# Patient Record
Sex: FEMALE | Race: ASIAN | NOT HISPANIC OR LATINO
[De-identification: names, ages, dates, MRNs, and addresses within clinical notes are randomized per-mention and may not be internally consistent; named-entity substitution may affect disease eponyms.]

---

## 2019-08-07 ENCOUNTER — APPOINTMENT (OUTPATIENT)
Dept: OTOLARYNGOLOGY | Facility: CLINIC | Age: 59
End: 2019-08-07
Payer: COMMERCIAL

## 2019-08-07 VITALS
BODY MASS INDEX: 27.31 KG/M2 | HEIGHT: 64 IN | DIASTOLIC BLOOD PRESSURE: 74 MMHG | WEIGHT: 160 LBS | HEART RATE: 53 BPM | SYSTOLIC BLOOD PRESSURE: 129 MMHG

## 2019-08-07 DIAGNOSIS — R59.9 ENLARGED LYMPH NODES, UNSPECIFIED: ICD-10-CM

## 2019-08-07 PROCEDURE — 99204 OFFICE O/P NEW MOD 45 MIN: CPT | Mod: 25

## 2019-08-07 PROCEDURE — 31575 DIAGNOSTIC LARYNGOSCOPY: CPT

## 2019-08-07 RX ORDER — LOSARTAN POTASSIUM 100 MG/1
TABLET, FILM COATED ORAL
Refills: 0 | Status: ACTIVE | COMMUNITY

## 2019-08-07 NOTE — CONSULT LETTER
[Dear  ___] : Dear  [unfilled], [Consult Letter:] : I had the pleasure of evaluating your patient, [unfilled]. [Please see my note below.] : Please see my note below. [Consult Closing:] : Thank you very much for allowing me to participate in the care of this patient.  If you have any questions, please do not hesitate to contact me. [Sincerely,] : Sincerely, [FreeTextEntry2] : Dr Judy Rivera [FreeTextEntry3] : \par Neil Kumar MD, FACS\par \par Otolaryngology-Head and Neck Surgery\par [employer] [facility]\par

## 2019-08-07 NOTE — HISTORY OF PRESENT ILLNESS
[de-identified] : Patient referred by Dr. Storm ( derm)  for cervical lymphadenopathy. First noticed   3 months ago and getting bigger, no URI prior lymph enlarge. Denies pain, dysphagia, odynophagia, dyspnea, dysphonia, nasal obstruction/bleeding, fever, weakness or weight loss.\par No contact with TB patients or cats.\par US of neck 6/2019 enlarge left level 1 lymph node, no bx done \par \par pcp - Judy

## 2019-08-13 ENCOUNTER — FORM ENCOUNTER (OUTPATIENT)
Age: 59
End: 2019-08-13

## 2019-08-14 ENCOUNTER — APPOINTMENT (OUTPATIENT)
Dept: ULTRASOUND IMAGING | Facility: IMAGING CENTER | Age: 59
End: 2019-08-14
Payer: COMMERCIAL

## 2019-08-14 ENCOUNTER — RESULT REVIEW (OUTPATIENT)
Age: 59
End: 2019-08-14

## 2019-08-14 ENCOUNTER — OUTPATIENT (OUTPATIENT)
Dept: OUTPATIENT SERVICES | Facility: HOSPITAL | Age: 59
LOS: 1 days | End: 2019-08-14
Payer: COMMERCIAL

## 2019-08-14 DIAGNOSIS — R59.0 LOCALIZED ENLARGED LYMPH NODES: ICD-10-CM

## 2019-08-14 PROCEDURE — 88305 TISSUE EXAM BY PATHOLOGIST: CPT

## 2019-08-14 PROCEDURE — 76942 ECHO GUIDE FOR BIOPSY: CPT | Mod: 26

## 2019-08-14 PROCEDURE — 88173 CYTOPATH EVAL FNA REPORT: CPT

## 2019-08-14 PROCEDURE — 88312 SPECIAL STAINS GROUP 1: CPT

## 2019-08-14 PROCEDURE — 20206 BIOPSY MUSCLE PERQ NEEDLE: CPT

## 2019-08-14 PROCEDURE — 88173 CYTOPATH EVAL FNA REPORT: CPT | Mod: 26

## 2019-08-14 PROCEDURE — 76942 ECHO GUIDE FOR BIOPSY: CPT

## 2019-08-14 PROCEDURE — 88305 TISSUE EXAM BY PATHOLOGIST: CPT | Mod: 26

## 2019-08-14 PROCEDURE — 88312 SPECIAL STAINS GROUP 1: CPT | Mod: 26,59

## 2019-08-22 ENCOUNTER — APPOINTMENT (OUTPATIENT)
Dept: OTOLARYNGOLOGY | Facility: CLINIC | Age: 59
End: 2019-08-22
Payer: COMMERCIAL

## 2019-08-22 VITALS
SYSTOLIC BLOOD PRESSURE: 117 MMHG | WEIGHT: 160 LBS | BODY MASS INDEX: 27.31 KG/M2 | HEIGHT: 64 IN | HEART RATE: 53 BPM | DIASTOLIC BLOOD PRESSURE: 70 MMHG

## 2019-08-22 PROCEDURE — 99214 OFFICE O/P EST MOD 30 MIN: CPT

## 2019-08-22 NOTE — HISTORY OF PRESENT ILLNESS
[de-identified] : Patient referred by Dr. Storm ( derm)  for cervical lymphadenopathy. First noticed   3 months ago and was getting bigger. No URI or dental infections recently. Denies pain, dysphagia, odynophagia, dyspnea, dysphonia, nasal obstruction/bleeding, fever, weakness or weight loss.\par No contact with TB patients or cats.\par US of neck 6/2019 enlarge left level 1 lymph node, no bx done \par pt is here for fna result,  post fna no pain or fever.\par Feels like it got slightly smaller after the FNA.\par Complete review of systems which was performed during a previous encounter was reviewed with the patient and there are no changes except as stated in the HPI section.\par

## 2019-08-22 NOTE — CONSULT LETTER
[Courtesy Letter:] : I had the pleasure of seeing your patient, [unfilled], in my office today. [Dear  ___] : Dear  [unfilled], [Please see my note below.] : Please see my note below. [Consult Closing:] : Thank you very much for allowing me to participate in the care of this patient.  If you have any questions, please do not hesitate to contact me. [Sincerely,] : Sincerely, [FreeTextEntry2] : Dr Judy Rivera  [FreeTextEntry3] : \par Neil Kumar MD, FACS\par \par Otolaryngology-Head and Neck Surgery\par [employer] at [facility]\par

## 2019-08-23 RX ORDER — CIPROFLOXACIN HYDROCHLORIDE 500 MG/1
500 TABLET, FILM COATED ORAL TWICE DAILY
Qty: 20 | Refills: 0 | Status: ACTIVE | COMMUNITY
Start: 2019-08-23 | End: 1900-01-01

## 2019-09-09 ENCOUNTER — APPOINTMENT (OUTPATIENT)
Dept: INFECTIOUS DISEASE | Facility: CLINIC | Age: 59
End: 2019-09-09
Payer: COMMERCIAL

## 2019-09-09 VITALS
WEIGHT: 166 LBS | OXYGEN SATURATION: 97 % | TEMPERATURE: 97.2 F | SYSTOLIC BLOOD PRESSURE: 169 MMHG | RESPIRATION RATE: 18 BRPM | HEART RATE: 54 BPM | DIASTOLIC BLOOD PRESSURE: 82 MMHG | HEIGHT: 64 IN | BODY MASS INDEX: 28.34 KG/M2

## 2019-09-09 DIAGNOSIS — R59.0 LOCALIZED ENLARGED LYMPH NODES: ICD-10-CM

## 2019-09-09 DIAGNOSIS — A02.9 SALMONELLA INFECTION, UNSPECIFIED: ICD-10-CM

## 2019-09-09 PROCEDURE — 99205 OFFICE O/P NEW HI 60 MIN: CPT

## 2019-10-16 LAB
ALBUMIN SERPL ELPH-MCNC: 4.3 G/DL
ALP BLD-CCNC: 125 U/L
ALT SERPL-CCNC: 37 U/L
ANION GAP SERPL CALC-SCNC: 15 MMOL/L
AST SERPL-CCNC: 26 U/L
BACTERIA BLD CULT: NORMAL
BACTERIA BLD CULT: NORMAL
BACTERIA STL CULT: NORMAL
BILIRUB SERPL-MCNC: 0.5 MG/DL
BUN SERPL-MCNC: 17 MG/DL
CALCIUM SERPL-MCNC: 9.5 MG/DL
CHLORIDE SERPL-SCNC: 103 MMOL/L
CO2 SERPL-SCNC: 22 MMOL/L
CREAT SERPL-MCNC: 0.76 MG/DL
CRP SERPL-MCNC: 0.24 MG/DL
ERYTHROCYTE [SEDIMENTATION RATE] IN BLOOD BY WESTERGREN METHOD: 35 MM/HR
GLUCOSE SERPL-MCNC: 154 MG/DL
HIV1+2 AB SPEC QL IA.RAPID: NONREACTIVE
LDH SERPL-CCNC: 195 U/L
M TB IFN-G BLD-IMP: NEGATIVE
POTASSIUM SERPL-SCNC: 4.3 MMOL/L
PROT SERPL-MCNC: 7 G/DL
QUANTIFERON TB PLUS MITOGEN MINUS NIL: 6.49 IU/ML
QUANTIFERON TB PLUS NIL: 0.02 IU/ML
QUANTIFERON TB PLUS TB1 MINUS NIL: 0.01 IU/ML
QUANTIFERON TB PLUS TB2 MINUS NIL: 0.01 IU/ML
SODIUM SERPL-SCNC: 140 MMOL/L

## 2019-10-16 NOTE — ASSESSMENT
[FreeTextEntry1] : Ms. Padilla is a pleasant 58 year old female with DM2, CAD for consultation for left cerivcal LN enlargement.\par \par She states about 3 months ago noticed a lump on her left neck. US neck 6/2019 with enlarged left level 1 LN. After FNA was performed 8/14/19, was improving. FNA culture with Salmonella sp.\par  She completed 10 days of cipro, but was developing eye itchiness and took the abx. Was scheduled for second needle aspiration but now resolved. \par \par Recommend:\par -Continue to monitor off antibiotics as lymphadnopathy resolved on Cipro.\par -Check stool culture\par -Check blood cultures x 2 sets\par -Check HIV\par -Check ESR/ CRP\par -Check LDH\par -Check Quant TB Gold\par \par RTC as needed.

## 2019-10-16 NOTE — HISTORY OF PRESENT ILLNESS
[FreeTextEntry1] : Ms. Padilla is a pleasant 58 year old female with DM2, CAD for consultation for left cerivcal LN enlargement.\par \par She states about 3 months ago noticed a lump on her left neck. US neck 6/2019 with enlarged left level 1 LN. After FNA was performed 8/14/19, was improving. FNA culture with Salmonella sp.\par  She completed 10 days of cipro, but was developing eye itchiness and took the abx. Was scheduled for second needle aspiration but now resolved. \par \par She was never dx with TB. Recalls negative Quant at her PMD. Her PMD is Judy Spencer. She was born in [facility] and came to the  in 1987. She visited [facility] in June 2019. Her PMHx includes CAD and DM2 last A1C was 8 a couple months ago. Her PSHx includes CABG x 4 at [facility]. Her allergies include sesaonal allergies, CT dye contrast where she develops hives. Her FHx includes mother and father with heart attacks.\par \par She overall feels well. No fevers, no chills.

## 2020-04-02 ENCOUNTER — INPATIENT (INPATIENT)
Facility: HOSPITAL | Age: 60
LOS: 14 days | Discharge: HOME | End: 2020-04-17
Attending: HOSPITALIST | Admitting: HOSPITALIST
Payer: MEDICAID

## 2020-04-02 VITALS
OXYGEN SATURATION: 91 % | DIASTOLIC BLOOD PRESSURE: 65 MMHG | HEART RATE: 62 BPM | TEMPERATURE: 100 F | RESPIRATION RATE: 19 BRPM | SYSTOLIC BLOOD PRESSURE: 150 MMHG

## 2020-04-02 DIAGNOSIS — I25.10 ATHEROSCLEROTIC HEART DISEASE OF NATIVE CORONARY ARTERY WITHOUT ANGINA PECTORIS: ICD-10-CM

## 2020-04-02 DIAGNOSIS — B34.9 VIRAL INFECTION, UNSPECIFIED: ICD-10-CM

## 2020-04-02 DIAGNOSIS — J96.01 ACUTE RESPIRATORY FAILURE WITH HYPOXIA: ICD-10-CM

## 2020-04-02 DIAGNOSIS — Z29.9 ENCOUNTER FOR PROPHYLACTIC MEASURES, UNSPECIFIED: ICD-10-CM

## 2020-04-02 DIAGNOSIS — I10 ESSENTIAL (PRIMARY) HYPERTENSION: ICD-10-CM

## 2020-04-02 DIAGNOSIS — E11.65 TYPE 2 DIABETES MELLITUS WITH HYPERGLYCEMIA: ICD-10-CM

## 2020-04-02 DIAGNOSIS — Z02.9 ENCOUNTER FOR ADMINISTRATIVE EXAMINATIONS, UNSPECIFIED: ICD-10-CM

## 2020-04-02 DIAGNOSIS — Z95.1 PRESENCE OF AORTOCORONARY BYPASS GRAFT: Chronic | ICD-10-CM

## 2020-04-02 LAB
ALBUMIN SERPL ELPH-MCNC: 3.8 G/DL — SIGNIFICANT CHANGE UP (ref 3.3–5)
ALP SERPL-CCNC: 110 U/L — SIGNIFICANT CHANGE UP (ref 40–120)
ALT FLD-CCNC: 24 U/L — SIGNIFICANT CHANGE UP (ref 4–33)
ANION GAP SERPL CALC-SCNC: 15 MMO/L — HIGH (ref 7–14)
AST SERPL-CCNC: 48 U/L — HIGH (ref 4–32)
BASE EXCESS BLDV CALC-SCNC: 1.9 MMOL/L — SIGNIFICANT CHANGE UP
BASOPHILS # BLD AUTO: 0.01 K/UL — SIGNIFICANT CHANGE UP (ref 0–0.2)
BASOPHILS NFR BLD AUTO: 0.2 % — SIGNIFICANT CHANGE UP (ref 0–2)
BILIRUB SERPL-MCNC: 0.3 MG/DL — SIGNIFICANT CHANGE UP (ref 0.2–1.2)
BUN SERPL-MCNC: 13 MG/DL — SIGNIFICANT CHANGE UP (ref 7–23)
CALCIUM SERPL-MCNC: 8.9 MG/DL — SIGNIFICANT CHANGE UP (ref 8.4–10.5)
CHLORIDE SERPL-SCNC: 95 MMOL/L — LOW (ref 98–107)
CO2 SERPL-SCNC: 23 MMOL/L — SIGNIFICANT CHANGE UP (ref 22–31)
CREAT SERPL-MCNC: 0.79 MG/DL — SIGNIFICANT CHANGE UP (ref 0.5–1.3)
CRP SERPL-MCNC: 68.4 MG/L — HIGH
EOSINOPHIL # BLD AUTO: 0 K/UL — SIGNIFICANT CHANGE UP (ref 0–0.5)
EOSINOPHIL NFR BLD AUTO: 0 % — SIGNIFICANT CHANGE UP (ref 0–6)
ERYTHROCYTE [SEDIMENTATION RATE] IN BLOOD: 58 MM/HR — HIGH (ref 4–25)
FERRITIN SERPL-MCNC: 635 NG/ML — HIGH (ref 15–150)
GLUCOSE SERPL-MCNC: 336 MG/DL — HIGH (ref 70–99)
HCO3 BLDV-SCNC: 25 MMOL/L — SIGNIFICANT CHANGE UP (ref 20–27)
HCT VFR BLD CALC: 37.6 % — SIGNIFICANT CHANGE UP (ref 34.5–45)
HGB BLD-MCNC: 11.9 G/DL — SIGNIFICANT CHANGE UP (ref 11.5–15.5)
IMM GRANULOCYTES NFR BLD AUTO: 0.9 % — SIGNIFICANT CHANGE UP (ref 0–1.5)
LDH SERPL L TO P-CCNC: 474 U/L — HIGH (ref 135–225)
LYMPHOCYTES # BLD AUTO: 0.67 K/UL — LOW (ref 1–3.3)
LYMPHOCYTES # BLD AUTO: 15.1 % — SIGNIFICANT CHANGE UP (ref 13–44)
MCHC RBC-ENTMCNC: 18.7 PG — LOW (ref 27–34)
MCHC RBC-ENTMCNC: 31.6 % — LOW (ref 32–36)
MCV RBC AUTO: 59.1 FL — LOW (ref 80–100)
MONOCYTES # BLD AUTO: 0.28 K/UL — SIGNIFICANT CHANGE UP (ref 0–0.9)
MONOCYTES NFR BLD AUTO: 6.3 % — SIGNIFICANT CHANGE UP (ref 2–14)
NEUTROPHILS # BLD AUTO: 3.44 K/UL — SIGNIFICANT CHANGE UP (ref 1.8–7.4)
NEUTROPHILS NFR BLD AUTO: 77.5 % — HIGH (ref 43–77)
NRBC # FLD: 0 K/UL — SIGNIFICANT CHANGE UP (ref 0–0)
PCO2 BLDV: 44 MMHG — SIGNIFICANT CHANGE UP (ref 41–51)
PH BLDV: 7.4 PH — SIGNIFICANT CHANGE UP (ref 7.32–7.43)
PLATELET # BLD AUTO: 176 K/UL — SIGNIFICANT CHANGE UP (ref 150–400)
PMV BLD: 9.9 FL — SIGNIFICANT CHANGE UP (ref 7–13)
PO2 BLDV: < 24 MMHG — LOW (ref 35–40)
POTASSIUM SERPL-MCNC: 5.1 MMOL/L — SIGNIFICANT CHANGE UP (ref 3.5–5.3)
POTASSIUM SERPL-SCNC: 5.1 MMOL/L — SIGNIFICANT CHANGE UP (ref 3.5–5.3)
PROCALCITONIN SERPL-MCNC: 0.1 NG/ML — SIGNIFICANT CHANGE UP (ref 0.02–0.1)
PROT SERPL-MCNC: 7.2 G/DL — SIGNIFICANT CHANGE UP (ref 6–8.3)
RBC # BLD: 6.36 M/UL — HIGH (ref 3.8–5.2)
RBC # FLD: 16.4 % — HIGH (ref 10.3–14.5)
SAO2 % BLDV: 33 % — LOW (ref 60–85)
SARS-COV-2 RNA SPEC QL NAA+PROBE: DETECTED
SODIUM SERPL-SCNC: 133 MMOL/L — LOW (ref 135–145)
WBC # BLD: 4.44 K/UL — SIGNIFICANT CHANGE UP (ref 3.8–10.5)
WBC # FLD AUTO: 4.44 K/UL — SIGNIFICANT CHANGE UP (ref 3.8–10.5)

## 2020-04-02 PROCEDURE — 71045 X-RAY EXAM CHEST 1 VIEW: CPT | Mod: 26

## 2020-04-02 PROCEDURE — 99233 SBSQ HOSP IP/OBS HIGH 50: CPT

## 2020-04-02 RX ORDER — GLUCAGON INJECTION, SOLUTION 0.5 MG/.1ML
1 INJECTION, SOLUTION SUBCUTANEOUS ONCE
Refills: 0 | Status: DISCONTINUED | OUTPATIENT
Start: 2020-04-02 | End: 2020-04-17

## 2020-04-02 RX ORDER — DEXTROSE 50 % IN WATER 50 %
25 SYRINGE (ML) INTRAVENOUS ONCE
Refills: 0 | Status: DISCONTINUED | OUTPATIENT
Start: 2020-04-02 | End: 2020-04-17

## 2020-04-02 RX ORDER — INSULIN DETEMIR 100/ML (3)
40 INSULIN PEN (ML) SUBCUTANEOUS AT BEDTIME
Refills: 0 | Status: DISCONTINUED | OUTPATIENT
Start: 2020-04-02 | End: 2020-04-02

## 2020-04-02 RX ORDER — SODIUM CHLORIDE 9 MG/ML
1000 INJECTION, SOLUTION INTRAVENOUS
Refills: 0 | Status: DISCONTINUED | OUTPATIENT
Start: 2020-04-02 | End: 2020-04-17

## 2020-04-02 RX ORDER — INSULIN GLARGINE 100 [IU]/ML
40 INJECTION, SOLUTION SUBCUTANEOUS AT BEDTIME
Refills: 0 | Status: DISCONTINUED | OUTPATIENT
Start: 2020-04-02 | End: 2020-04-02

## 2020-04-02 RX ORDER — INSULIN GLARGINE 100 [IU]/ML
40 INJECTION, SOLUTION SUBCUTANEOUS AT BEDTIME
Refills: 0 | Status: DISCONTINUED | OUTPATIENT
Start: 2020-04-02 | End: 2020-04-04

## 2020-04-02 RX ORDER — ACETAMINOPHEN 500 MG
650 TABLET ORAL EVERY 6 HOURS
Refills: 0 | Status: DISCONTINUED | OUTPATIENT
Start: 2020-04-02 | End: 2020-04-17

## 2020-04-02 RX ORDER — HYDROXYCHLOROQUINE SULFATE 200 MG
TABLET ORAL
Refills: 0 | Status: COMPLETED | OUTPATIENT
Start: 2020-04-02 | End: 2020-04-07

## 2020-04-02 RX ORDER — SODIUM CHLORIDE 9 MG/ML
500 INJECTION INTRAMUSCULAR; INTRAVENOUS; SUBCUTANEOUS ONCE
Refills: 0 | Status: COMPLETED | OUTPATIENT
Start: 2020-04-02 | End: 2020-04-02

## 2020-04-02 RX ORDER — DEXTROSE 50 % IN WATER 50 %
15 SYRINGE (ML) INTRAVENOUS ONCE
Refills: 0 | Status: DISCONTINUED | OUTPATIENT
Start: 2020-04-02 | End: 2020-04-17

## 2020-04-02 RX ORDER — SIMVASTATIN 20 MG/1
20 TABLET, FILM COATED ORAL AT BEDTIME
Refills: 0 | Status: DISCONTINUED | OUTPATIENT
Start: 2020-04-02 | End: 2020-04-17

## 2020-04-02 RX ORDER — INSULIN LISPRO 100/ML
VIAL (ML) SUBCUTANEOUS
Refills: 0 | Status: DISCONTINUED | OUTPATIENT
Start: 2020-04-02 | End: 2020-04-17

## 2020-04-02 RX ORDER — ACETAMINOPHEN 500 MG
650 TABLET ORAL ONCE
Refills: 0 | Status: COMPLETED | OUTPATIENT
Start: 2020-04-02 | End: 2020-04-02

## 2020-04-02 RX ORDER — ASPIRIN/CALCIUM CARB/MAGNESIUM 324 MG
81 TABLET ORAL DAILY
Refills: 0 | Status: DISCONTINUED | OUTPATIENT
Start: 2020-04-02 | End: 2020-04-17

## 2020-04-02 RX ORDER — HYDROXYCHLOROQUINE SULFATE 200 MG
400 TABLET ORAL EVERY 12 HOURS
Refills: 0 | Status: COMPLETED | OUTPATIENT
Start: 2020-04-02 | End: 2020-04-03

## 2020-04-02 RX ORDER — INSULIN LISPRO 100/ML
5 VIAL (ML) SUBCUTANEOUS ONCE
Refills: 0 | Status: COMPLETED | OUTPATIENT
Start: 2020-04-02 | End: 2020-04-02

## 2020-04-02 RX ORDER — METOPROLOL TARTRATE 50 MG
25 TABLET ORAL DAILY
Refills: 0 | Status: DISCONTINUED | OUTPATIENT
Start: 2020-04-02 | End: 2020-04-17

## 2020-04-02 RX ORDER — HYDROXYCHLOROQUINE SULFATE 200 MG
200 TABLET ORAL EVERY 12 HOURS
Refills: 0 | Status: COMPLETED | OUTPATIENT
Start: 2020-04-03 | End: 2020-04-07

## 2020-04-02 RX ORDER — METOCLOPRAMIDE HCL 10 MG
10 TABLET ORAL ONCE
Refills: 0 | Status: COMPLETED | OUTPATIENT
Start: 2020-04-02 | End: 2020-04-02

## 2020-04-02 RX ORDER — DEXTROSE 50 % IN WATER 50 %
12.5 SYRINGE (ML) INTRAVENOUS ONCE
Refills: 0 | Status: DISCONTINUED | OUTPATIENT
Start: 2020-04-02 | End: 2020-04-17

## 2020-04-02 RX ADMIN — Medication 10 MILLIGRAM(S): at 10:20

## 2020-04-02 RX ADMIN — SODIUM CHLORIDE 500 MILLILITER(S): 9 INJECTION INTRAMUSCULAR; INTRAVENOUS; SUBCUTANEOUS at 11:00

## 2020-04-02 RX ADMIN — Medication 650 MILLIGRAM(S): at 10:20

## 2020-04-02 RX ADMIN — SODIUM CHLORIDE 500 MILLILITER(S): 9 INJECTION INTRAMUSCULAR; INTRAVENOUS; SUBCUTANEOUS at 10:20

## 2020-04-02 RX ADMIN — Medication 650 MILLIGRAM(S): at 21:48

## 2020-04-02 RX ADMIN — Medication 5 UNIT(S): at 13:40

## 2020-04-02 NOTE — H&P ADULT - NSHPPHYSICALEXAM_GEN_ALL_CORE
Vital Signs Last 24 Hrs  T(C): 37.4 (02 Apr 2020 14:20), Max: 37.8 (02 Apr 2020 08:53)  T(F): 99.3 (02 Apr 2020 14:20), Max: 100 (02 Apr 2020 08:53)  HR: 80 (02 Apr 2020 14:20) (62 - 80)  BP: 155/59 (02 Apr 2020 14:20) (150/65 - 156/65)  RR: 20 (02 Apr 2020 14:20) (19 - 22)  SpO2: 92% (02 Apr 2020 14:20) (91% - 95%)  O2 sat 92% on RA at rest, 85% on RA walking    PHYSICAL EXAM:  GENERAL: NAD, mild distress   HEAD:  Atraumatic, Normocephalic  EYES: EOMI, PERRLA, conjunctiva and sclera clear  ENMT: No tonsillar erythema, exudates, or enlargement; Moist mucous membranes, Good dentition  NECK: Supple, No JVD  NERVOUS SYSTEM: AOX3, motor and sensation grossly intact in b/l UE and b/l LE  PSYCHIATRIC: Appropriate affect and mood  CHEST/LUNG: +Crackles left base> right   HEART: Regular rate and rhythm; No murmurs, rubs, or gallops. No LE edema  ABDOMEN: Soft, Nontender, Nondistended; Bowel sounds present  EXTREMITIES:  2+ Peripheral Pulses, No clubbing, cyanosis  SKIN: No rashes or lesions

## 2020-04-02 NOTE — H&P ADULT - PROBLEM SELECTOR PLAN 4
Hold Losartan in setting of COVID infection  - prn hydralazine for SBP>160 - Hold Losartan in setting of COVID infection, as BPs in acceptable range presently. Will restart if necesssary or given PRN hydralazine for SBP>160

## 2020-04-02 NOTE — H&P ADULT - HISTORY OF PRESENT ILLNESS
Per current hospital medicine emergency protocol, in an effort to reduce COVID exposures and also conserve PPE for necessary encounters, H&P below is obtained from chart review without direct patient contact unless acute changes    60 y/o M with PMHx of HTN, DM (on insulin), CAD s/p CABG here for 3 days of chills, dry cough, nausea, and diffuse persistent headache. Worsening SOB and difficultly breathing today. No recorded fevers at home. Denies abdominal pain, diarrhea, urinary symptoms, chest pain. 60 yo M with PMHx of HTN, DM (on insulin), CAD s/p CABG present with three days of cough, shortness of breath and chills. Also endorsing a frontal headache, and nasal congestion. Presents today because the shortness of breath was non-remitting. Also experiencing nausea and vomiting. No fevers, no sick contacts, but her daughter works in urgent care.

## 2020-04-02 NOTE — H&P ADULT - PROBLEM SELECTOR PLAN 3
CAD s/p CABG   - CAD s/p CABG   No ischemic changes on EKG, low suspicion for ACS   - c/w simvastatin 20  - c/w metoprolol succinate ER 25mg PO daily  - c/w ASA 81 po daily CAD s/p CABG   No ischemic changes on EKG, low suspicion for ACS   - C/w simvastatin 20 mg qhs  - C/w metoprolol succinate ER 25 mg PO daily  - C/w ASA 81 mg PO daily

## 2020-04-02 NOTE — ED PROVIDER NOTE - PHYSICAL EXAMINATION
Gen: well developed female, mild respiratory distress  HEENT: NCAT, EOMI, no nasal discharge, mucous membranes dry  CV: RRR, +S1/S2, no M/R/G  Resp: lungs clear tachypneic RR 22, O2 sat 92% on RA at rest, 85% on RA walking  GI: Abdomen soft non-distended, NTTP, no masses  MSK: No open wounds, no bruising, no LE edema  Neuro: A&Ox4, following commands, moving all four extremities spontaneously  Psych: appropriate mood

## 2020-04-02 NOTE — ED PROVIDER NOTE - CLINICAL SUMMARY MEDICAL DECISION MAKING FREE TEXT BOX
58 y/o F with PMHx of DM, cardiac hx, HTN presents with 3 days of chills, HA, nausea, cough. Vitals stable here. O2 stat 92% at rest, walking O2 stat of 84% with tachycardic. Respiratory rate 30. Differential COVID vs viral vs PNA. Low suspicion for PE or ACS. Plan for labs, chest x-ray. Likely admission given O2 stat and respiratory monitor. 58 y/o F with PMHx of DM, cardiac hx, HTN presents with 3 days of chills, HA, nausea, cough. Vitals stable here. O2 stat 92% at rest, walking O2 stat of 84% with tachycardic. Respiratory rate 30. Differential COVID vs viral vs PNA. Low suspicion for PE or ACS. Plan for labs, chest x-ray. Likely will require admission given need for supplemental O2 at rest and pulse-ox monitoring.

## 2020-04-02 NOTE — H&P ADULT - NSHPLABSRESULTS_GEN_ALL_CORE
LABS:                        11.9   4.44  )-----------( 176      ( 02 Apr 2020 09:50 )             37.6     02 Apr 2020 09:50    133    |  95     |  13     ----------------------------<  336    5.1     |  23     |  0.79     Ca    8.9        02 Apr 2020 09:50    TPro  7.2    /  Alb  3.8    /  TBili  0.3    /  DBili  x      /  AST  48     /  ALT  24     /  AlkPhos  110    02 Apr 2020 09:50    RADIOLOGY & ADDITIONAL TESTS:  CXR: Clear lungs     Imaging Personally Reviewed:  [x] YES

## 2020-04-02 NOTE — H&P ADULT - PROBLEM SELECTOR PLAN 2
Type 2 DM on insulin  - f/u A1c  - ISS Type 2 DM on insulin 40 units of levemir QHS, will continue home regimen   - f/u A1c  - ISS Type 2 DM on insulin 40 units of levemir QHS, will continue home regimen   - lantus 40units qhs   - f/u A1c  - ISS Type 2 DM on insulin 40 units of levemir QHS, will continue home regimen   - C/w Lantus 40 units qhs   - Start ISS tid with FS checks tid before meals, as pt a poorly controlled diabetic  - F/u A1c

## 2020-04-02 NOTE — H&P ADULT - PROBLEM SELECTOR PLAN 5
DVT: SQH  Diet: DASH/TLC  Code: DVT: SQH  Diet: DASH/TLC  Code: Full DVT ppx: Lovenox SQ  Diet: DASH/TLC  Code: Full

## 2020-04-02 NOTE — H&P ADULT - PROBLEM SELECTOR PLAN 1
Fever, tachypnea; pulmonary source given b/l opacifications and hypoxia. Concern for Covid-19   - f/u COVID PCR  - Supplemental oxygen. low grade fever, tachypnea; hypoxic respiratory failure 2/2 viral pna   + COVID PCR  - Supplemental oxygen low grade fever, tachypnea; hypoxic respiratory failure 2/2 viral pna   + COVID PCR  - Supplemental oxygen  - plaquenil low grade fever, tachypnea; hypoxic respiratory failure 2/2 viral pna   + COVID PCR  - Hydroxychloroquine 400 mg x1 given overnight  - Tylenol PRN for fevers  - Robitussin PRN for cough  - Supplemental O2 PRN with NC (6 LPM max) or NRB. Will avoid HFNC or NPPV given suspicion for COVID-19.   - Trend procalcitonin, CRP, ESR, ferritin q48-72 hrs for now  - Monitor respiratory status closely

## 2020-04-02 NOTE — ED PROVIDER NOTE - ATTENDING CONTRIBUTION TO CARE
I performed a face to face evaluation of this patient and performed a full history and physical examination on the patient.  I agree with the resident's history, physical examination, and plan of the patient.  Pt with URI symptoms, with crackles at bases, heart wnl, abd soft nontender, no chest pain, concerning for Covid, will get CXR, labs, EKG and admit as pt with desaturations.

## 2020-04-02 NOTE — H&P ADULT - PROBLEM SELECTOR PLAN 6
Transitions of Care Status:  1.  Name of PCP:   2.  PCP Contacted on Admission: [ ] Y  [ ] N   [ ] N/A  3.  PCP contacted at Discharge:    [ ] Y  [ ] N   [ ] N/A  4.  Post-Discharge Appointment Date and Location:  5.  Summary of Handoff given to PCP:

## 2020-04-02 NOTE — H&P ADULT - NSHPREVIEWOFSYSTEMS_GEN_ALL_CORE
Per ED documentation   CONSTITUTIONAL: No fever, weight loss, or fatigue  EYES: No eye pain, visual disturbances, or discharge  ENMT:  No difficulty hearing, tinnitus, vertigo; No sinus or throat pain  RESPIRATORY: No cough, wheezing, chills or hemoptysis; No shortness of breath  CARDIOVASCULAR: No chest pain, palpitations, dizziness, or leg swelling  GASTROINTESTINAL: No abdominal or epigastric pain. No nausea, vomiting, or hematemesis; No diarrhea or constipation. No melena or hematochezia.  GENITOURINARY: No dysuria, frequency, hematuria, or incontinence  NEUROLOGICAL: No headaches, loss of strength, numbness, or tremors  SKIN: No itching, burning, rashes, or lesions   LYMPH NODES: No enlarged glands  ENDOCRINE: No heat or cold intolerance; No polydipsia or polyuria  MUSCULOSKELETAL: No joint pain or swelling;   PSYCHIATRIC: Denies depression, anxiety  HEME/LYMPH: No easy bruising, or bleeding gums  ALLERGY AND IMMUNOLOGIC: No hives or eczema CONSTITUTIONAL: +chills   EYES: No eye pain, visual disturbances, or discharge  ENMT: No sinus or throat pain  RESPIRATORY: +cough, shortness of breath  CARDIOVASCULAR: No chest pain, palpitations, dizziness, or leg swelling  GASTROINTESTINAL: +Nausea   GENITOURINARY: No dysuria  NEUROLOGICAL: +headache  SKIN: No rashes, or lesions   LYMPH NODES: No enlarged glands  ENDOCRINE: No polydipsia or polyuria  MUSCULOSKELETAL: No joint pain or swelling;   PSYCHIATRIC: Denies depression, anxiety  HEME/LYMPH: No easy bruising, or bleeding gums  ALLERGY AND IMMUNOLOGIC: No hives or eczema

## 2020-04-03 LAB
ALBUMIN SERPL ELPH-MCNC: 3.3 G/DL — SIGNIFICANT CHANGE UP (ref 3.3–5)
ALP SERPL-CCNC: 94 U/L — SIGNIFICANT CHANGE UP (ref 40–120)
ALT FLD-CCNC: 19 U/L — SIGNIFICANT CHANGE UP (ref 4–33)
ANION GAP SERPL CALC-SCNC: 12 MMO/L — SIGNIFICANT CHANGE UP (ref 7–14)
APTT BLD: 28.9 SEC — SIGNIFICANT CHANGE UP (ref 27.5–36.3)
AST SERPL-CCNC: 45 U/L — HIGH (ref 4–32)
BASE EXCESS BLDV CALC-SCNC: 0.4 MMOL/L — SIGNIFICANT CHANGE UP
BASOPHILS # BLD AUTO: 0.01 K/UL — SIGNIFICANT CHANGE UP (ref 0–0.2)
BASOPHILS NFR BLD AUTO: 0.3 % — SIGNIFICANT CHANGE UP (ref 0–2)
BILIRUB SERPL-MCNC: 0.3 MG/DL — SIGNIFICANT CHANGE UP (ref 0.2–1.2)
BLOOD GAS VENOUS - CREATININE: 0.78 MG/DL — SIGNIFICANT CHANGE UP (ref 0.5–1.3)
BLOOD GAS VENOUS - FIO2: 36 — SIGNIFICANT CHANGE UP
BUN SERPL-MCNC: 12 MG/DL — SIGNIFICANT CHANGE UP (ref 7–23)
CALCIUM SERPL-MCNC: 8.7 MG/DL — SIGNIFICANT CHANGE UP (ref 8.4–10.5)
CHLORIDE BLDV-SCNC: 101 MMOL/L — SIGNIFICANT CHANGE UP (ref 96–108)
CHLORIDE SERPL-SCNC: 96 MMOL/L — LOW (ref 98–107)
CK MB BLD-MCNC: 2.88 NG/ML — SIGNIFICANT CHANGE UP (ref 1–4.7)
CK SERPL-CCNC: 870 U/L — HIGH (ref 25–170)
CO2 SERPL-SCNC: 22 MMOL/L — SIGNIFICANT CHANGE UP (ref 22–31)
CREAT SERPL-MCNC: 0.68 MG/DL — SIGNIFICANT CHANGE UP (ref 0.5–1.3)
EOSINOPHIL # BLD AUTO: 0 K/UL — SIGNIFICANT CHANGE UP (ref 0–0.5)
EOSINOPHIL NFR BLD AUTO: 0 % — SIGNIFICANT CHANGE UP (ref 0–6)
GAS PNL BLDV: 132 MMOL/L — LOW (ref 136–146)
GLUCOSE BLDC GLUCOMTR-MCNC: 233 MG/DL — HIGH (ref 70–99)
GLUCOSE BLDC GLUCOMTR-MCNC: 301 MG/DL — HIGH (ref 70–99)
GLUCOSE BLDC GLUCOMTR-MCNC: 304 MG/DL — HIGH (ref 70–99)
GLUCOSE BLDC GLUCOMTR-MCNC: 319 MG/DL — HIGH (ref 70–99)
GLUCOSE BLDV-MCNC: 250 MG/DL — HIGH (ref 70–99)
GLUCOSE SERPL-MCNC: 238 MG/DL — HIGH (ref 70–99)
HBA1C BLD-MCNC: 11.8 % — HIGH (ref 4–5.6)
HCO3 BLDV-SCNC: 25 MMOL/L — SIGNIFICANT CHANGE UP (ref 20–27)
HCT VFR BLD CALC: 35.8 % — SIGNIFICANT CHANGE UP (ref 34.5–45)
HCT VFR BLDV CALC: 36.5 % — SIGNIFICANT CHANGE UP (ref 34.5–45)
HCV AB S/CO SERPL IA: 0.15 S/CO — SIGNIFICANT CHANGE UP (ref 0–0.99)
HCV AB SERPL-IMP: SIGNIFICANT CHANGE UP
HGB BLD-MCNC: 11.3 G/DL — LOW (ref 11.5–15.5)
HGB BLDV-MCNC: 11.9 G/DL — SIGNIFICANT CHANGE UP (ref 11.5–15.5)
IMM GRANULOCYTES NFR BLD AUTO: 0.5 % — SIGNIFICANT CHANGE UP (ref 0–1.5)
INR BLD: 0.91 — SIGNIFICANT CHANGE UP (ref 0.88–1.17)
IRON SATN MFR SERPL: 13 UG/DL — LOW (ref 30–160)
IRON SATN MFR SERPL: 224 UG/DL — SIGNIFICANT CHANGE UP (ref 140–530)
LACTATE BLDV-MCNC: 1.8 MMOL/L — SIGNIFICANT CHANGE UP (ref 0.5–2)
LYMPHOCYTES # BLD AUTO: 1.34 K/UL — SIGNIFICANT CHANGE UP (ref 1–3.3)
LYMPHOCYTES # BLD AUTO: 33.9 % — SIGNIFICANT CHANGE UP (ref 13–44)
MAGNESIUM SERPL-MCNC: 1.9 MG/DL — SIGNIFICANT CHANGE UP (ref 1.6–2.6)
MCHC RBC-ENTMCNC: 18.5 PG — LOW (ref 27–34)
MCHC RBC-ENTMCNC: 31.6 % — LOW (ref 32–36)
MCV RBC AUTO: 58.6 FL — LOW (ref 80–100)
MONOCYTES # BLD AUTO: 0.36 K/UL — SIGNIFICANT CHANGE UP (ref 0–0.9)
MONOCYTES NFR BLD AUTO: 9.1 % — SIGNIFICANT CHANGE UP (ref 2–14)
NEUTROPHILS # BLD AUTO: 2.22 K/UL — SIGNIFICANT CHANGE UP (ref 1.8–7.4)
NEUTROPHILS NFR BLD AUTO: 56.2 % — SIGNIFICANT CHANGE UP (ref 43–77)
NRBC # FLD: 0 K/UL — SIGNIFICANT CHANGE UP (ref 0–0)
PCO2 BLDV: 37 MMHG — LOW (ref 41–51)
PH BLDV: 7.43 PH — SIGNIFICANT CHANGE UP (ref 7.32–7.43)
PHOSPHATE SERPL-MCNC: 2.9 MG/DL — SIGNIFICANT CHANGE UP (ref 2.5–4.5)
PLATELET # BLD AUTO: 182 K/UL — SIGNIFICANT CHANGE UP (ref 150–400)
PMV BLD: 9.6 FL — SIGNIFICANT CHANGE UP (ref 7–13)
PO2 BLDV: 55 MMHG — HIGH (ref 35–40)
POTASSIUM BLDV-SCNC: 3.4 MMOL/L — SIGNIFICANT CHANGE UP (ref 3.4–4.5)
POTASSIUM SERPL-MCNC: 3.6 MMOL/L — SIGNIFICANT CHANGE UP (ref 3.5–5.3)
POTASSIUM SERPL-SCNC: 3.6 MMOL/L — SIGNIFICANT CHANGE UP (ref 3.5–5.3)
PROT SERPL-MCNC: 7.2 G/DL — SIGNIFICANT CHANGE UP (ref 6–8.3)
PROTHROM AB SERPL-ACNC: 10.4 SEC — SIGNIFICANT CHANGE UP (ref 9.8–13.1)
RBC # BLD: 6.11 M/UL — HIGH (ref 3.8–5.2)
RBC # FLD: 15.5 % — HIGH (ref 10.3–14.5)
SAO2 % BLDV: 85.9 % — HIGH (ref 60–85)
SODIUM SERPL-SCNC: 130 MMOL/L — LOW (ref 135–145)
TRANSFERRIN SERPL-MCNC: 189 MG/DL — LOW (ref 200–360)
TROPONIN T, HIGH SENSITIVITY: 13 NG/L — SIGNIFICANT CHANGE UP (ref ?–14)
UIBC SERPL-MCNC: 210.7 UG/DL — SIGNIFICANT CHANGE UP (ref 110–370)
WBC # BLD: 3.95 K/UL — SIGNIFICANT CHANGE UP (ref 3.8–10.5)
WBC # FLD AUTO: 3.95 K/UL — SIGNIFICANT CHANGE UP (ref 3.8–10.5)

## 2020-04-03 PROCEDURE — 99233 SBSQ HOSP IP/OBS HIGH 50: CPT

## 2020-04-03 RX ORDER — ENOXAPARIN SODIUM 100 MG/ML
40 INJECTION SUBCUTANEOUS DAILY
Refills: 0 | Status: DISCONTINUED | OUTPATIENT
Start: 2020-04-03 | End: 2020-04-07

## 2020-04-03 RX ORDER — ACETAMINOPHEN 500 MG
650 TABLET ORAL EVERY 6 HOURS
Refills: 0 | Status: DISCONTINUED | OUTPATIENT
Start: 2020-04-03 | End: 2020-04-03

## 2020-04-03 RX ADMIN — Medication 3: at 00:27

## 2020-04-03 RX ADMIN — Medication 2: at 17:36

## 2020-04-03 RX ADMIN — Medication 81 MILLIGRAM(S): at 12:17

## 2020-04-03 RX ADMIN — INSULIN GLARGINE 40 UNIT(S): 100 INJECTION, SOLUTION SUBCUTANEOUS at 23:59

## 2020-04-03 RX ADMIN — SIMVASTATIN 20 MILLIGRAM(S): 20 TABLET, FILM COATED ORAL at 23:12

## 2020-04-03 RX ADMIN — ENOXAPARIN SODIUM 40 MILLIGRAM(S): 100 INJECTION SUBCUTANEOUS at 12:17

## 2020-04-03 RX ADMIN — Medication 200 MILLIGRAM(S): at 17:37

## 2020-04-03 RX ADMIN — Medication 200 MILLIGRAM(S): at 12:16

## 2020-04-03 RX ADMIN — Medication 400 MILLIGRAM(S): at 12:17

## 2020-04-03 RX ADMIN — Medication 200 MILLIGRAM(S): at 23:12

## 2020-04-03 RX ADMIN — Medication 81 MILLIGRAM(S): at 00:10

## 2020-04-03 RX ADMIN — Medication 3: at 09:05

## 2020-04-03 RX ADMIN — Medication 400 MILLIGRAM(S): at 00:10

## 2020-04-03 RX ADMIN — Medication 4: at 12:16

## 2020-04-03 RX ADMIN — SIMVASTATIN 20 MILLIGRAM(S): 20 TABLET, FILM COATED ORAL at 00:11

## 2020-04-03 RX ADMIN — INSULIN GLARGINE 40 UNIT(S): 100 INJECTION, SOLUTION SUBCUTANEOUS at 00:26

## 2020-04-03 RX ADMIN — Medication 25 MILLIGRAM(S): at 06:14

## 2020-04-03 NOTE — PROGRESS NOTE ADULT - SUBJECTIVE AND OBJECTIVE BOX
Patient feels that breathing is a little better.  SaO2>90 currently on room air.      Vital Signs Last 24 Hrs  T(C): 37.5 (03 Apr 2020 06:10), Max: 38.7 (02 Apr 2020 21:21)  T(F): 99.5 (03 Apr 2020 06:10), Max: 101.6 (02 Apr 2020 21:21)  HR: 67 (03 Apr 2020 06:10) (65 - 80)  BP: 119/58 (03 Apr 2020 06:10) (119/58 - 156/65)  BP(mean): --  RR: 20 (03 Apr 2020 06:10) (20 - 22)  SpO2: 95% (03 Apr 2020 06:10) (92% - 98%)  Daily     Daily   I&O's Summary      Neck: no bruits, JVD, adenopathy  Chest: clear  Cor: [id number], RR, normal S1S2 with no mrght  Abd: soft, nontender, BS+ and no HSM  Ext: w/o CCE  Pulses:2+->dp bilaterally    MEDICATIONS  (STANDING):  aspirin enteric coated 81 milliGRAM(s) Oral daily  dextrose 5%. 1000 milliLiter(s) (50 mL/Hr) IV Continuous <Continuous>  dextrose 50% Injectable 12.5 Gram(s) IV Push once  dextrose 50% Injectable 25 Gram(s) IV Push once  dextrose 50% Injectable 25 Gram(s) IV Push once  enoxaparin Injectable 40 milliGRAM(s) SubCutaneous daily  hydroxychloroquine 400 milliGRAM(s) Oral every 12 hours  hydroxychloroquine 200 milliGRAM(s) Oral every 12 hours  hydroxychloroquine   Oral   insulin glargine Injectable (LANTUS) 40 Unit(s) SubCutaneous at bedtime  insulin lispro (HumaLOG) corrective regimen sliding scale   SubCutaneous three times a day before meals  metoprolol succinate ER 25 milliGRAM(s) Oral daily  simvastatin 20 milliGRAM(s) Oral at bedtime    MEDICATIONS  (PRN):  acetaminophen   Tablet .. 650 milliGRAM(s) Oral every 6 hours PRN Temp greater or equal to 38C (100.4F)  dextrose 40% Gel 15 Gram(s) Oral once PRN Blood Glucose LESS THAN 70 milliGRAM(s)/deciliter  glucagon  Injectable 1 milliGRAM(s) IntraMuscular once PRN Glucose LESS THAN 70 milligrams/deciliter  guaiFENesin   Syrup  (Sugar-Free) 200 milliGRAM(s) Oral every 6 hours PRN Cough      04-03    130<L>  |  96<L>  |  12  ----------------------------<  238<H>  3.6   |  22  |  0.68    Ca    8.7      03 Apr 2020 06:30  Phos  2.9     04-03  Mg     1.9     04-03    TPro  7.2  /  Alb  3.3  /  TBili  0.3  /  DBili  x   /  AST  45<H>  /  ALT  19  /  AlkPhos  94  04-03                          11.3   3.95  )-----------( 182      ( 03 Apr 2020 06:30 )             35.8     PT/INR - ( 03 Apr 2020 06:30 )   PT: 10.4 SEC;   INR: 0.91          PTT - ( 03 Apr 2020 06:30 )  PTT:28.9 SEC    HEALTH ISSUES - PROBLEM Dx:  COVI-19: continue to monitor SaO2  Essential hypertension: well controlled  Coronary artery disease involving native heart without angina pectoris, unspecified vessel or lesion type: stable and asymptomatic  Type 2 diabetes mellitus with hyperglycemia, without long-term current use of insulin: sugars slightly elevated. No med changes for now in view of stress  Acute respiratory failure with hypoxia: improving

## 2020-04-04 LAB
ALBUMIN SERPL ELPH-MCNC: 3.4 G/DL — SIGNIFICANT CHANGE UP (ref 3.3–5)
ALP SERPL-CCNC: 100 U/L — SIGNIFICANT CHANGE UP (ref 40–120)
ALT FLD-CCNC: 21 U/L — SIGNIFICANT CHANGE UP (ref 4–33)
ANION GAP SERPL CALC-SCNC: 15 MMO/L — HIGH (ref 7–14)
ANISOCYTOSIS BLD QL: SIGNIFICANT CHANGE UP
AST SERPL-CCNC: 50 U/L — HIGH (ref 4–32)
BASOPHILS # BLD AUTO: 0.01 K/UL — SIGNIFICANT CHANGE UP (ref 0–0.2)
BASOPHILS NFR BLD AUTO: 0.2 % — SIGNIFICANT CHANGE UP (ref 0–2)
BASOPHILS NFR SPEC: 0 % — SIGNIFICANT CHANGE UP (ref 0–2)
BILIRUB SERPL-MCNC: 0.3 MG/DL — SIGNIFICANT CHANGE UP (ref 0.2–1.2)
BLASTS # FLD: 0 % — SIGNIFICANT CHANGE UP (ref 0–0)
BUN SERPL-MCNC: 12 MG/DL — SIGNIFICANT CHANGE UP (ref 7–23)
CALCIUM SERPL-MCNC: 9.1 MG/DL — SIGNIFICANT CHANGE UP (ref 8.4–10.5)
CHLORIDE SERPL-SCNC: 93 MMOL/L — LOW (ref 98–107)
CO2 SERPL-SCNC: 23 MMOL/L — SIGNIFICANT CHANGE UP (ref 22–31)
CREAT SERPL-MCNC: 0.76 MG/DL — SIGNIFICANT CHANGE UP (ref 0.5–1.3)
EOSINOPHIL # BLD AUTO: 0 K/UL — SIGNIFICANT CHANGE UP (ref 0–0.5)
EOSINOPHIL NFR BLD AUTO: 0 % — SIGNIFICANT CHANGE UP (ref 0–6)
EOSINOPHIL NFR FLD: 0 % — SIGNIFICANT CHANGE UP (ref 0–6)
GLUCOSE BLDC GLUCOMTR-MCNC: 202 MG/DL — HIGH (ref 70–99)
GLUCOSE BLDC GLUCOMTR-MCNC: 248 MG/DL — HIGH (ref 70–99)
GLUCOSE BLDC GLUCOMTR-MCNC: 255 MG/DL — HIGH (ref 70–99)
GLUCOSE BLDC GLUCOMTR-MCNC: 315 MG/DL — HIGH (ref 70–99)
GLUCOSE SERPL-MCNC: 238 MG/DL — HIGH (ref 70–99)
HCT VFR BLD CALC: 37 % — SIGNIFICANT CHANGE UP (ref 34.5–45)
HGB BLD-MCNC: 11.7 G/DL — SIGNIFICANT CHANGE UP (ref 11.5–15.5)
HYPOCHROMIA BLD QL: SIGNIFICANT CHANGE UP
IMM GRANULOCYTES NFR BLD AUTO: 0.7 % — SIGNIFICANT CHANGE UP (ref 0–1.5)
LYMPHOCYTES # BLD AUTO: 1.19 K/UL — SIGNIFICANT CHANGE UP (ref 1–3.3)
LYMPHOCYTES # BLD AUTO: 21.5 % — SIGNIFICANT CHANGE UP (ref 13–44)
LYMPHOCYTES NFR SPEC AUTO: 13 % — SIGNIFICANT CHANGE UP (ref 13–44)
MAGNESIUM SERPL-MCNC: 2.1 MG/DL — SIGNIFICANT CHANGE UP (ref 1.6–2.6)
MCHC RBC-ENTMCNC: 18.7 PG — LOW (ref 27–34)
MCHC RBC-ENTMCNC: 31.6 % — LOW (ref 32–36)
MCV RBC AUTO: 59.2 FL — LOW (ref 80–100)
METAMYELOCYTES # FLD: 0 % — SIGNIFICANT CHANGE UP (ref 0–1)
MICROCYTES BLD QL: SIGNIFICANT CHANGE UP
MONOCYTES # BLD AUTO: 0.43 K/UL — SIGNIFICANT CHANGE UP (ref 0–0.9)
MONOCYTES NFR BLD AUTO: 7.8 % — SIGNIFICANT CHANGE UP (ref 2–14)
MONOCYTES NFR BLD: 7 % — SIGNIFICANT CHANGE UP (ref 2–9)
MYELOCYTES NFR BLD: 0 % — SIGNIFICANT CHANGE UP (ref 0–0)
NEUTROPHIL AB SER-ACNC: 73.9 % — SIGNIFICANT CHANGE UP (ref 43–77)
NEUTROPHILS # BLD AUTO: 3.87 K/UL — SIGNIFICANT CHANGE UP (ref 1.8–7.4)
NEUTROPHILS NFR BLD AUTO: 69.8 % — SIGNIFICANT CHANGE UP (ref 43–77)
NEUTS BAND # BLD: 0.9 % — SIGNIFICANT CHANGE UP (ref 0–6)
NRBC # FLD: 0 K/UL — SIGNIFICANT CHANGE UP (ref 0–0)
OTHER - HEMATOLOGY %: 0 — SIGNIFICANT CHANGE UP
OVALOCYTES BLD QL SMEAR: SIGNIFICANT CHANGE UP
PHOSPHATE SERPL-MCNC: 3.2 MG/DL — SIGNIFICANT CHANGE UP (ref 2.5–4.5)
PLATELET # BLD AUTO: 221 K/UL — SIGNIFICANT CHANGE UP (ref 150–400)
PLATELET COUNT - ESTIMATE: NORMAL — SIGNIFICANT CHANGE UP
PMV BLD: 9.5 FL — SIGNIFICANT CHANGE UP (ref 7–13)
POIKILOCYTOSIS BLD QL AUTO: SIGNIFICANT CHANGE UP
POLYCHROMASIA BLD QL SMEAR: SLIGHT — SIGNIFICANT CHANGE UP
POTASSIUM SERPL-MCNC: 4.1 MMOL/L — SIGNIFICANT CHANGE UP (ref 3.5–5.3)
POTASSIUM SERPL-SCNC: 4.1 MMOL/L — SIGNIFICANT CHANGE UP (ref 3.5–5.3)
PROMYELOCYTES # FLD: 0 % — SIGNIFICANT CHANGE UP (ref 0–0)
PROT SERPL-MCNC: 7.5 G/DL — SIGNIFICANT CHANGE UP (ref 6–8.3)
RBC # BLD: 6.25 M/UL — HIGH (ref 3.8–5.2)
RBC # FLD: 16.3 % — HIGH (ref 10.3–14.5)
SODIUM SERPL-SCNC: 131 MMOL/L — LOW (ref 135–145)
VARIANT LYMPHS # BLD: 4.3 % — SIGNIFICANT CHANGE UP
WBC # BLD: 5.54 K/UL — SIGNIFICANT CHANGE UP (ref 3.8–10.5)
WBC # FLD AUTO: 5.54 K/UL — SIGNIFICANT CHANGE UP (ref 3.8–10.5)

## 2020-04-04 PROCEDURE — 99233 SBSQ HOSP IP/OBS HIGH 50: CPT

## 2020-04-04 RX ORDER — INSULIN LISPRO 100/ML
VIAL (ML) SUBCUTANEOUS AT BEDTIME
Refills: 0 | Status: DISCONTINUED | OUTPATIENT
Start: 2020-04-04 | End: 2020-04-17

## 2020-04-04 RX ORDER — INSULIN GLARGINE 100 [IU]/ML
42 INJECTION, SOLUTION SUBCUTANEOUS AT BEDTIME
Refills: 0 | Status: DISCONTINUED | OUTPATIENT
Start: 2020-04-04 | End: 2020-04-05

## 2020-04-04 RX ORDER — ONDANSETRON 8 MG/1
4 TABLET, FILM COATED ORAL ONCE
Refills: 0 | Status: COMPLETED | OUTPATIENT
Start: 2020-04-04 | End: 2020-04-04

## 2020-04-04 RX ADMIN — Medication 200 MILLIGRAM(S): at 23:57

## 2020-04-04 RX ADMIN — SIMVASTATIN 20 MILLIGRAM(S): 20 TABLET, FILM COATED ORAL at 21:35

## 2020-04-04 RX ADMIN — Medication 25 MILLIGRAM(S): at 05:43

## 2020-04-04 RX ADMIN — Medication 200 MILLIGRAM(S): at 12:28

## 2020-04-04 RX ADMIN — Medication 2: at 08:35

## 2020-04-04 RX ADMIN — Medication 3: at 17:14

## 2020-04-04 RX ADMIN — Medication 81 MILLIGRAM(S): at 12:27

## 2020-04-04 RX ADMIN — Medication 100 MILLIGRAM(S): at 21:35

## 2020-04-04 RX ADMIN — ONDANSETRON 4 MILLIGRAM(S): 8 TABLET, FILM COATED ORAL at 00:28

## 2020-04-04 RX ADMIN — Medication 4: at 12:27

## 2020-04-04 RX ADMIN — INSULIN GLARGINE 42 UNIT(S): 100 INJECTION, SOLUTION SUBCUTANEOUS at 21:35

## 2020-04-04 RX ADMIN — ENOXAPARIN SODIUM 40 MILLIGRAM(S): 100 INJECTION SUBCUTANEOUS at 12:27

## 2020-04-04 RX ADMIN — Medication 2: at 00:33

## 2020-04-04 RX ADMIN — Medication 100 MILLIGRAM(S): at 12:29

## 2020-04-04 RX ADMIN — Medication 200 MILLIGRAM(S): at 21:35

## 2020-04-04 NOTE — PROGRESS NOTE ADULT - SUBJECTIVE AND OBJECTIVE BOX
Patient is a 59y old  Female who presents with a chief complaint of hypoxic respiratory failure, COVID-19+ (03 Apr 2020 10:42)                  Vital Signs Last 24 Hrs  T(C): 36.3 (04 Apr 2020 05:10), Max: 37.2 (03 Apr 2020 22:38)  T(F): 97.4 (04 Apr 2020 05:10), Max: 99 (03 Apr 2020 22:38)  HR: 84 (04 Apr 2020 05:10) (53 - 84)  BP: 121/60 (04 Apr 2020 05:10) (121/60 - 136/64)  BP(mean): --  RR: 18 (04 Apr 2020 05:10) (18 - 24)  SpO2: 97% (04 Apr 2020 05:10) (81% - 97%)  Daily     Daily   I&O's Summary      Neck: no bruits, JVD, adenopathy  Chest: clear  Cor: [id number], RR, normal S1S2 with no mrght  Abd: soft, nontender, BS+ and no HSM  Ext: w/o CCE  Pulses:2+->dp bilaterally    MEDICATIONS  (STANDING):  aspirin enteric coated 81 milliGRAM(s) Oral daily  benzonatate 100 milliGRAM(s) Oral three times a day  dextrose 5%. 1000 milliLiter(s) (50 mL/Hr) IV Continuous <Continuous>  dextrose 50% Injectable 12.5 Gram(s) IV Push once  dextrose 50% Injectable 25 Gram(s) IV Push once  dextrose 50% Injectable 25 Gram(s) IV Push once  enoxaparin Injectable 40 milliGRAM(s) SubCutaneous daily  hydroxychloroquine 200 milliGRAM(s) Oral every 12 hours  hydroxychloroquine   Oral   insulin glargine Injectable (LANTUS) 40 Unit(s) SubCutaneous at bedtime  insulin lispro (HumaLOG) corrective regimen sliding scale   SubCutaneous three times a day before meals  insulin lispro (HumaLOG) corrective regimen sliding scale   SubCutaneous at bedtime  metoprolol succinate ER 25 milliGRAM(s) Oral daily  simvastatin 20 milliGRAM(s) Oral at bedtime    MEDICATIONS  (PRN):  acetaminophen   Tablet .. 650 milliGRAM(s) Oral every 6 hours PRN Temp greater or equal to 38C (100.4F)  dextrose 40% Gel 15 Gram(s) Oral once PRN Blood Glucose LESS THAN 70 milliGRAM(s)/deciliter  glucagon  Injectable 1 milliGRAM(s) IntraMuscular once PRN Glucose LESS THAN 70 milligrams/deciliter  guaiFENesin   Syrup  (Sugar-Free) 200 milliGRAM(s) Oral every 6 hours PRN Cough      04-03    130<L>  |  96<L>  |  12  ----------------------------<  238<H>  3.6   |  22  |  0.68    Ca    8.7      03 Apr 2020 06:30  Phos  2.9     04-03  Mg     1.9     04-03    TPro  7.2  /  Alb  3.3  /  TBili  0.3  /  DBili  x   /  AST  45<H>  /  ALT  19  /  AlkPhos  94  04-03                          11.7   5.54  )-----------( 221      ( 04 Apr 2020 06:54 )             37.0     PT/INR - ( 03 Apr 2020 06:30 )   PT: 10.4 SEC;   INR: 0.91          PTT - ( 03 Apr 2020 06:30 )  PTT:28.9 SEC    HEALTH ISSUES - PROBLEM Dx:  COVID-19: still SOB ; occasionally requiring NRB; continue to monitor SaO2; benzonatate for cough  Essential hypertension: well controlled  Coronary artery disease involving native heart without angina pectoris, unspecified vessel or lesion type: stable and asymptomatic  Type 2 diabetes mellitus with hyperglycemia, without long-term current use of insulin: sugars slightly elevated. increase Lantus to 42U  Acute respiratory failure with hypoxia: unchanged

## 2020-04-05 LAB
ALBUMIN SERPL ELPH-MCNC: 3.5 G/DL — SIGNIFICANT CHANGE UP (ref 3.3–5)
ALP SERPL-CCNC: 113 U/L — SIGNIFICANT CHANGE UP (ref 40–120)
ALT FLD-CCNC: 19 U/L — SIGNIFICANT CHANGE UP (ref 4–33)
ANION GAP SERPL CALC-SCNC: 16 MMO/L — HIGH (ref 7–14)
AST SERPL-CCNC: 36 U/L — HIGH (ref 4–32)
BILIRUB SERPL-MCNC: 0.4 MG/DL — SIGNIFICANT CHANGE UP (ref 0.2–1.2)
BUN SERPL-MCNC: 13 MG/DL — SIGNIFICANT CHANGE UP (ref 7–23)
CALCIUM SERPL-MCNC: 8.7 MG/DL — SIGNIFICANT CHANGE UP (ref 8.4–10.5)
CHLORIDE SERPL-SCNC: 94 MMOL/L — LOW (ref 98–107)
CO2 SERPL-SCNC: 22 MMOL/L — SIGNIFICANT CHANGE UP (ref 22–31)
CREAT SERPL-MCNC: 0.73 MG/DL — SIGNIFICANT CHANGE UP (ref 0.5–1.3)
CRP SERPL-MCNC: 77 MG/L — HIGH
FERRITIN SERPL-MCNC: 567.2 NG/ML — HIGH (ref 15–150)
FIBRINOGEN PPP-MCNC: 920 MG/DL — HIGH (ref 300–520)
GLUCOSE BLDC GLUCOMTR-MCNC: 157 MG/DL — HIGH (ref 70–99)
GLUCOSE BLDC GLUCOMTR-MCNC: 165 MG/DL — HIGH (ref 70–99)
GLUCOSE BLDC GLUCOMTR-MCNC: 189 MG/DL — HIGH (ref 70–99)
GLUCOSE BLDC GLUCOMTR-MCNC: 235 MG/DL — HIGH (ref 70–99)
GLUCOSE SERPL-MCNC: 228 MG/DL — HIGH (ref 70–99)
LDH SERPL L TO P-CCNC: 412 U/L — HIGH (ref 135–225)
MAGNESIUM SERPL-MCNC: 2.1 MG/DL — SIGNIFICANT CHANGE UP (ref 1.6–2.6)
PHOSPHATE SERPL-MCNC: 3.6 MG/DL — SIGNIFICANT CHANGE UP (ref 2.5–4.5)
POTASSIUM SERPL-MCNC: 4 MMOL/L — SIGNIFICANT CHANGE UP (ref 3.5–5.3)
POTASSIUM SERPL-SCNC: 4 MMOL/L — SIGNIFICANT CHANGE UP (ref 3.5–5.3)
PROCALCITONIN SERPL-MCNC: 0.08 NG/ML — SIGNIFICANT CHANGE UP (ref 0.02–0.1)
PROT SERPL-MCNC: 6.8 G/DL — SIGNIFICANT CHANGE UP (ref 6–8.3)
SODIUM SERPL-SCNC: 132 MMOL/L — LOW (ref 135–145)

## 2020-04-05 PROCEDURE — 93010 ELECTROCARDIOGRAM REPORT: CPT

## 2020-04-05 PROCEDURE — 99233 SBSQ HOSP IP/OBS HIGH 50: CPT

## 2020-04-05 RX ORDER — ALBUTEROL 90 UG/1
2 AEROSOL, METERED ORAL EVERY 6 HOURS
Refills: 0 | Status: DISCONTINUED | OUTPATIENT
Start: 2020-04-05 | End: 2020-04-17

## 2020-04-05 RX ORDER — INSULIN GLARGINE 100 [IU]/ML
44 INJECTION, SOLUTION SUBCUTANEOUS AT BEDTIME
Refills: 0 | Status: DISCONTINUED | OUTPATIENT
Start: 2020-04-05 | End: 2020-04-08

## 2020-04-05 RX ADMIN — Medication 100 MILLIGRAM(S): at 13:00

## 2020-04-05 RX ADMIN — Medication 1: at 18:23

## 2020-04-05 RX ADMIN — Medication 1: at 08:30

## 2020-04-05 RX ADMIN — Medication 100 MILLIGRAM(S): at 05:13

## 2020-04-05 RX ADMIN — Medication 2: at 12:59

## 2020-04-05 RX ADMIN — SIMVASTATIN 20 MILLIGRAM(S): 20 TABLET, FILM COATED ORAL at 23:01

## 2020-04-05 RX ADMIN — Medication 25 MILLIGRAM(S): at 05:13

## 2020-04-05 RX ADMIN — INSULIN GLARGINE 44 UNIT(S): 100 INJECTION, SOLUTION SUBCUTANEOUS at 23:00

## 2020-04-05 RX ADMIN — Medication 200 MILLIGRAM(S): at 13:00

## 2020-04-05 RX ADMIN — Medication 81 MILLIGRAM(S): at 13:00

## 2020-04-05 RX ADMIN — Medication 40 MILLIGRAM(S): at 19:04

## 2020-04-05 RX ADMIN — ENOXAPARIN SODIUM 40 MILLIGRAM(S): 100 INJECTION SUBCUTANEOUS at 13:00

## 2020-04-05 NOTE — PROGRESS NOTE ADULT - PROVIDER SPECIALTY LIST ADULT
Hospitalist Subsequent Stages Histo Method Verbiage: Using a similar technique to that described above, a thin layer of tissue was removed from all areas where tumor was visible on the previous stage.  The tissue was again oriented, mapped, dyed, and processed as above.

## 2020-04-05 NOTE — PROGRESS NOTE ADULT - SUBJECTIVE AND OBJECTIVE BOX
Patient still very SOB but with acceptable oxygenation on NRB.  Now on Plaquenil.      Vital Signs Last 24 Hrs  T(C): 36.8 (04 Apr 2020 23:23), Max: 37.1 (04 Apr 2020 11:57)  T(F): 98.3 (04 Apr 2020 23:23), Max: 98.7 (04 Apr 2020 11:57)  HR: 74 (05 Apr 2020 05:10) (60 - 74)  BP: 117/53 (05 Apr 2020 05:10) (117/53 - 129/61)  BP(mean): --  RR: 20 (04 Apr 2020 23:23) (20 - 20)  SpO2: 95% (04 Apr 2020 23:23) (95% - 95%)  Daily     Daily   I&O's Summary      Neck: no bruits, JVD, adenopathy  Chest: clear  Cor: [id number], RR, normal S1S2 with no mrght  Abd: soft, nontender, BS+ and no HSM  Ext: w/o CCE  Pulses:2+->dp bilaterally    MEDICATIONS  (STANDING):  aspirin enteric coated 81 milliGRAM(s) Oral daily  benzonatate 100 milliGRAM(s) Oral three times a day  dextrose 5%. 1000 milliLiter(s) (50 mL/Hr) IV Continuous <Continuous>  dextrose 50% Injectable 12.5 Gram(s) IV Push once  dextrose 50% Injectable 25 Gram(s) IV Push once  dextrose 50% Injectable 25 Gram(s) IV Push once  enoxaparin Injectable 40 milliGRAM(s) SubCutaneous daily  hydroxychloroquine 200 milliGRAM(s) Oral every 12 hours  hydroxychloroquine   Oral   insulin glargine Injectable (LANTUS) 42 Unit(s) SubCutaneous at bedtime  insulin lispro (HumaLOG) corrective regimen sliding scale   SubCutaneous at bedtime  insulin lispro (HumaLOG) corrective regimen sliding scale   SubCutaneous three times a day before meals  metoprolol succinate ER 25 milliGRAM(s) Oral daily  simvastatin 20 milliGRAM(s) Oral at bedtime    MEDICATIONS  (PRN):  acetaminophen   Tablet .. 650 milliGRAM(s) Oral every 6 hours PRN Temp greater or equal to 38C (100.4F)  dextrose 40% Gel 15 Gram(s) Oral once PRN Blood Glucose LESS THAN 70 milliGRAM(s)/deciliter  glucagon  Injectable 1 milliGRAM(s) IntraMuscular once PRN Glucose LESS THAN 70 milligrams/deciliter  guaiFENesin   Syrup  (Sugar-Free) 200 milliGRAM(s) Oral every 6 hours PRN Cough      04-04    131<L>  |  93<L>  |  12  ----------------------------<  238<H>  4.1   |  23  |  0.76    Ca    9.1      04 Apr 2020 06:54  Phos  3.2     04-04  Mg     2.1     04-04    TPro  7.5  /  Alb  3.4  /  TBili  0.3  /  DBili  x   /  AST  50<H>  /  ALT  21  /  AlkPhos  100  04-04                          11.7   5.54  )-----------( 221      ( 04 Apr 2020 06:54 )             37.0         HEALTH ISSUES - PROBLEM Dx:  COVID-19: still SOB ;requiring NRB; continue to monitor SaO2; benzonatate for cough; Plaquenil; consider Solumedrol  Essential hypertension: well controlled  Coronary artery disease involving native heart without angina pectoris, unspecified vessel or lesion type: stable and asymptomatic  Type 2 diabetes mellitus with hyperglycemia, without long-term current use of insulin: sugars slightly elevated. increase Lantus to 44U

## 2020-04-06 LAB
ALBUMIN SERPL ELPH-MCNC: 3.5 G/DL — SIGNIFICANT CHANGE UP (ref 3.3–5)
ALP SERPL-CCNC: 141 U/L — HIGH (ref 40–120)
ALT FLD-CCNC: 24 U/L — SIGNIFICANT CHANGE UP (ref 4–33)
ANION GAP SERPL CALC-SCNC: 15 MMO/L — HIGH (ref 7–14)
AST SERPL-CCNC: 34 U/L — HIGH (ref 4–32)
BASOPHILS # BLD AUTO: 0.01 K/UL — SIGNIFICANT CHANGE UP (ref 0–0.2)
BASOPHILS NFR BLD AUTO: 0.2 % — SIGNIFICANT CHANGE UP (ref 0–2)
BILIRUB SERPL-MCNC: 0.5 MG/DL — SIGNIFICANT CHANGE UP (ref 0.2–1.2)
BUN SERPL-MCNC: 15 MG/DL — SIGNIFICANT CHANGE UP (ref 7–23)
CALCIUM SERPL-MCNC: 9.6 MG/DL — SIGNIFICANT CHANGE UP (ref 8.4–10.5)
CHLORIDE SERPL-SCNC: 96 MMOL/L — LOW (ref 98–107)
CO2 SERPL-SCNC: 24 MMOL/L — SIGNIFICANT CHANGE UP (ref 22–31)
CREAT SERPL-MCNC: 0.73 MG/DL — SIGNIFICANT CHANGE UP (ref 0.5–1.3)
EOSINOPHIL # BLD AUTO: 0 K/UL — SIGNIFICANT CHANGE UP (ref 0–0.5)
EOSINOPHIL NFR BLD AUTO: 0 % — SIGNIFICANT CHANGE UP (ref 0–6)
GLUCOSE BLDC GLUCOMTR-MCNC: 252 MG/DL — HIGH (ref 70–99)
GLUCOSE BLDC GLUCOMTR-MCNC: 296 MG/DL — HIGH (ref 70–99)
GLUCOSE BLDC GLUCOMTR-MCNC: 345 MG/DL — HIGH (ref 70–99)
GLUCOSE SERPL-MCNC: 265 MG/DL — HIGH (ref 70–99)
HCT VFR BLD CALC: 38.8 % — SIGNIFICANT CHANGE UP (ref 34.5–45)
HGB BLD-MCNC: 11.9 G/DL — SIGNIFICANT CHANGE UP (ref 11.5–15.5)
IMM GRANULOCYTES NFR BLD AUTO: 0.7 % — SIGNIFICANT CHANGE UP (ref 0–1.5)
LYMPHOCYTES # BLD AUTO: 0.92 K/UL — LOW (ref 1–3.3)
LYMPHOCYTES # BLD AUTO: 16.4 % — SIGNIFICANT CHANGE UP (ref 13–44)
MAGNESIUM SERPL-MCNC: 2.3 MG/DL — SIGNIFICANT CHANGE UP (ref 1.6–2.6)
MANUAL SMEAR VERIFICATION: SIGNIFICANT CHANGE UP
MCHC RBC-ENTMCNC: 18.4 PG — LOW (ref 27–34)
MCHC RBC-ENTMCNC: 30.7 % — LOW (ref 32–36)
MCV RBC AUTO: 60 FL — LOW (ref 80–100)
MONOCYTES # BLD AUTO: 0.31 K/UL — SIGNIFICANT CHANGE UP (ref 0–0.9)
MONOCYTES NFR BLD AUTO: 5.5 % — SIGNIFICANT CHANGE UP (ref 2–14)
NEUTROPHILS # BLD AUTO: 4.32 K/UL — SIGNIFICANT CHANGE UP (ref 1.8–7.4)
NEUTROPHILS NFR BLD AUTO: 77.2 % — HIGH (ref 43–77)
NRBC # FLD: 0 K/UL — SIGNIFICANT CHANGE UP (ref 0–0)
PHOSPHATE SERPL-MCNC: 3.6 MG/DL — SIGNIFICANT CHANGE UP (ref 2.5–4.5)
PLATELET # BLD AUTO: 348 K/UL — SIGNIFICANT CHANGE UP (ref 150–400)
PMV BLD: 9.3 FL — SIGNIFICANT CHANGE UP (ref 7–13)
POTASSIUM SERPL-MCNC: 4.3 MMOL/L — SIGNIFICANT CHANGE UP (ref 3.5–5.3)
POTASSIUM SERPL-SCNC: 4.3 MMOL/L — SIGNIFICANT CHANGE UP (ref 3.5–5.3)
PROT SERPL-MCNC: 8.1 G/DL — SIGNIFICANT CHANGE UP (ref 6–8.3)
RBC # BLD: 6.47 M/UL — HIGH (ref 3.8–5.2)
RBC # FLD: 16.2 % — HIGH (ref 10.3–14.5)
SODIUM SERPL-SCNC: 135 MMOL/L — SIGNIFICANT CHANGE UP (ref 135–145)
WBC # BLD: 5.6 K/UL — SIGNIFICANT CHANGE UP (ref 3.8–10.5)
WBC # FLD AUTO: 5.6 K/UL — SIGNIFICANT CHANGE UP (ref 3.8–10.5)

## 2020-04-06 PROCEDURE — 93010 ELECTROCARDIOGRAM REPORT: CPT

## 2020-04-06 PROCEDURE — 99233 SBSQ HOSP IP/OBS HIGH 50: CPT

## 2020-04-06 RX ORDER — ANAKINRA 100MG/0.67
100 SYRINGE (ML) SUBCUTANEOUS EVERY 6 HOURS
Refills: 0 | Status: COMPLETED | OUTPATIENT
Start: 2020-04-06 | End: 2020-04-09

## 2020-04-06 RX ADMIN — Medication 200 MILLIGRAM(S): at 10:40

## 2020-04-06 RX ADMIN — Medication 200 MILLIGRAM(S): at 00:56

## 2020-04-06 RX ADMIN — Medication 40 MILLIGRAM(S): at 06:46

## 2020-04-06 RX ADMIN — Medication 100 MILLIGRAM(S): at 06:46

## 2020-04-06 RX ADMIN — Medication 100 MILLIGRAM(S): at 12:20

## 2020-04-06 RX ADMIN — Medication 3: at 09:00

## 2020-04-06 RX ADMIN — Medication 1: at 22:12

## 2020-04-06 RX ADMIN — Medication 100 MILLIGRAM(S): at 22:12

## 2020-04-06 RX ADMIN — Medication 25 MILLIGRAM(S): at 06:46

## 2020-04-06 RX ADMIN — INSULIN GLARGINE 44 UNIT(S): 100 INJECTION, SOLUTION SUBCUTANEOUS at 22:12

## 2020-04-06 RX ADMIN — Medication 81 MILLIGRAM(S): at 10:40

## 2020-04-06 RX ADMIN — Medication 40 MILLIGRAM(S): at 18:37

## 2020-04-06 RX ADMIN — SIMVASTATIN 20 MILLIGRAM(S): 20 TABLET, FILM COATED ORAL at 22:12

## 2020-04-06 RX ADMIN — Medication 100 MILLIGRAM(S): at 23:35

## 2020-04-06 RX ADMIN — ENOXAPARIN SODIUM 40 MILLIGRAM(S): 100 INJECTION SUBCUTANEOUS at 10:40

## 2020-04-06 RX ADMIN — Medication 4: at 12:19

## 2020-04-06 RX ADMIN — Medication 200 MILLIGRAM(S): at 22:12

## 2020-04-06 RX ADMIN — Medication 5: at 17:08

## 2020-04-06 RX ADMIN — Medication 100 MILLIGRAM(S): at 18:38

## 2020-04-06 NOTE — PROGRESS NOTE ADULT - ASSESSMENT
COVID-19: still SOB ;requiring NRB; continue to monitor SpO2; benzonatate for cough; Plaquenil; Solumedrol  Essential hypertension: well controlled  Coronary artery disease involving native heart without angina pectoris, unspecified vessel or lesion type: stable and asymptomatic  Type 2 diabetes mellitus with hyperglycemia, without long-term current use of insulin: sugars slightly elevated. increase Lantus to 44U

## 2020-04-06 NOTE — PROGRESS NOTE ADULT - SUBJECTIVE AND OBJECTIVE BOX
Patient is a 59y old  Female who presents with a chief complaint of hypoxic respiratory failure, COVID-19+ (05 Apr 2020 09:15)      SUBJECTIVE / OVERNIGHT EVENTS: Pt generally feeling tired, SOB with minimal exertion. Reports "a little" improvement since admission.    MEDICATIONS  (STANDING):  aspirin enteric coated 81 milliGRAM(s) Oral daily  benzonatate 100 milliGRAM(s) Oral three times a day  dextrose 5%. 1000 milliLiter(s) (50 mL/Hr) IV Continuous <Continuous>  dextrose 50% Injectable 12.5 Gram(s) IV Push once  dextrose 50% Injectable 25 Gram(s) IV Push once  dextrose 50% Injectable 25 Gram(s) IV Push once  enoxaparin Injectable 40 milliGRAM(s) SubCutaneous daily  hydroxychloroquine 200 milliGRAM(s) Oral every 12 hours  hydroxychloroquine   Oral   insulin glargine Injectable (LANTUS) 44 Unit(s) SubCutaneous at bedtime  insulin lispro (HumaLOG) corrective regimen sliding scale   SubCutaneous at bedtime  insulin lispro (HumaLOG) corrective regimen sliding scale   SubCutaneous three times a day before meals  methylPREDNISolone sodium succinate Injectable 40 milliGRAM(s) IV Push every 12 hours  metoprolol succinate ER 25 milliGRAM(s) Oral daily  simvastatin 20 milliGRAM(s) Oral at bedtime    MEDICATIONS  (PRN):  acetaminophen   Tablet .. 650 milliGRAM(s) Oral every 6 hours PRN Temp greater or equal to 38C (100.4F)  ALBUTerol    90 MICROgram(s) HFA Inhaler 2 Puff(s) Inhalation every 6 hours PRN Shortness of Breath and/or Wheezing  dextrose 40% Gel 15 Gram(s) Oral once PRN Blood Glucose LESS THAN 70 milliGRAM(s)/deciliter  glucagon  Injectable 1 milliGRAM(s) IntraMuscular once PRN Glucose LESS THAN 70 milligrams/deciliter  guaiFENesin   Syrup  (Sugar-Free) 200 milliGRAM(s) Oral every 6 hours PRN Cough      CAPILLARY BLOOD GLUCOSE      POCT Blood Glucose.: 345 mg/dL (06 Apr 2020 12:03)  POCT Blood Glucose.: 252 mg/dL (06 Apr 2020 08:23)  POCT Blood Glucose.: 189 mg/dL (05 Apr 2020 22:25)  POCT Blood Glucose.: 165 mg/dL (05 Apr 2020 17:36)    I&O's Summary    05 Apr 2020 07:01  -  06 Apr 2020 07:00  --------------------------------------------------------  IN: 720 mL / OUT: 0 mL / NET: 720 mL        PHYSICAL EXAM:  Vital Signs Last 24 Hrs  T(C): 36.8 (06 Apr 2020 10:35), Max: 36.8 (06 Apr 2020 10:35)  T(F): 98.3 (06 Apr 2020 10:35), Max: 98.3 (06 Apr 2020 10:35)  HR: 74 (06 Apr 2020 10:35) (56 - 74)  BP: 106/62 (06 Apr 2020 10:35) (106/62 - 133/99)  BP(mean): --  RR: 20 (06 Apr 2020 10:35) (20 - 20)  SpO2: 92% (06 Apr 2020 10:35) (92% - 92%)  CONSTITUTIONAL: appears tired, mildly labored breathing  ENMT: Moist oral mucosa, no pharyngeal injection or exudates; normal dentition  RESPIRATORY: Normal respiratory effort; lungs are clear to auscultation bilaterally  CARDIOVASCULAR: Regular rate and rhythm, normal S1 and S2, no murmur/rub/gallop; No lower extremity edema; Peripheral pulses are 2+ bilaterally  ABDOMEN: Nontender to palpation, normoactive bowel sounds, no rebound/guarding; No hepatosplenomegaly      LABS:                        11.9   5.60  )-----------( 348      ( 06 Apr 2020 08:30 )             38.8     04-06    135  |  96<L>  |  15  ----------------------------<  265<H>  4.3   |  24  |  0.73    Ca    9.6      06 Apr 2020 08:30  Phos  3.6     04-06  Mg     2.3     04-06    TPro  8.1  /  Alb  3.5  /  TBili  0.5  /  DBili  x   /  AST  34<H>  /  ALT  24  /  AlkPhos  141<H>  04-06                RADIOLOGY & ADDITIONAL TESTS:  Results Reviewed:   Imaging Personally Reviewed:  Electrocardiogram Personally Reviewed:    COORDINATION OF CARE:  Care Discussed with Consultants/Other Providers [Y/N]:  Prior or Outpatient Records Reviewed [Y/N]:

## 2020-04-07 LAB
CRP SERPL-MCNC: 33.3 MG/L — HIGH
GLUCOSE BLDC GLUCOMTR-MCNC: 238 MG/DL — HIGH (ref 70–99)
GLUCOSE BLDC GLUCOMTR-MCNC: 271 MG/DL — HIGH (ref 70–99)
GLUCOSE BLDC GLUCOMTR-MCNC: 281 MG/DL — HIGH (ref 70–99)
GLUCOSE BLDC GLUCOMTR-MCNC: 309 MG/DL — HIGH (ref 70–99)
GLUCOSE BLDC GLUCOMTR-MCNC: 314 MG/DL — HIGH (ref 70–99)

## 2020-04-07 PROCEDURE — 99233 SBSQ HOSP IP/OBS HIGH 50: CPT

## 2020-04-07 RX ORDER — ENOXAPARIN SODIUM 100 MG/ML
40 INJECTION SUBCUTANEOUS
Refills: 0 | Status: DISCONTINUED | OUTPATIENT
Start: 2020-04-07 | End: 2020-04-17

## 2020-04-07 RX ORDER — INSULIN LISPRO 100/ML
6 VIAL (ML) SUBCUTANEOUS
Refills: 0 | Status: DISCONTINUED | OUTPATIENT
Start: 2020-04-07 | End: 2020-04-08

## 2020-04-07 RX ORDER — FUROSEMIDE 40 MG
40 TABLET ORAL DAILY
Refills: 0 | Status: DISCONTINUED | OUTPATIENT
Start: 2020-04-07 | End: 2020-04-09

## 2020-04-07 RX ADMIN — Medication 100 MILLIGRAM(S): at 23:33

## 2020-04-07 RX ADMIN — Medication 200 MILLIGRAM(S): at 11:32

## 2020-04-07 RX ADMIN — SIMVASTATIN 20 MILLIGRAM(S): 20 TABLET, FILM COATED ORAL at 23:33

## 2020-04-07 RX ADMIN — Medication 40 MILLIGRAM(S): at 17:40

## 2020-04-07 RX ADMIN — Medication 100 MILLIGRAM(S): at 12:32

## 2020-04-07 RX ADMIN — INSULIN GLARGINE 44 UNIT(S): 100 INJECTION, SOLUTION SUBCUTANEOUS at 23:29

## 2020-04-07 RX ADMIN — Medication 40 MILLIGRAM(S): at 04:43

## 2020-04-07 RX ADMIN — Medication 81 MILLIGRAM(S): at 11:32

## 2020-04-07 RX ADMIN — Medication 1: at 23:29

## 2020-04-07 RX ADMIN — Medication 2: at 09:10

## 2020-04-07 RX ADMIN — Medication 4: at 17:41

## 2020-04-07 RX ADMIN — Medication 6 UNIT(S): at 17:41

## 2020-04-07 RX ADMIN — Medication 100 MILLIGRAM(S): at 18:24

## 2020-04-07 RX ADMIN — ENOXAPARIN SODIUM 40 MILLIGRAM(S): 100 INJECTION SUBCUTANEOUS at 18:25

## 2020-04-07 RX ADMIN — Medication 4: at 12:33

## 2020-04-07 RX ADMIN — Medication 40 MILLIGRAM(S): at 17:41

## 2020-04-07 RX ADMIN — Medication 100 MILLIGRAM(S): at 11:31

## 2020-04-07 RX ADMIN — Medication 25 MILLIGRAM(S): at 04:43

## 2020-04-07 RX ADMIN — Medication 100 MILLIGRAM(S): at 04:44

## 2020-04-07 NOTE — PROGRESS NOTE ADULT - SUBJECTIVE AND OBJECTIVE BOX
Patient is a 59y old  Female who presents with a chief complaint of hypoxic respiratory failure, COVID-19+ (06 Apr 2020 12:23)      SUBJECTIVE / OVERNIGHT EVENTS: Pt feels about the same as yesterday. States appetite is a little better. But overall, no real improvement in SOB.    MEDICATIONS  (STANDING):  anakinra Injectable 100 milliGRAM(s) SubCutaneous every 6 hours  aspirin enteric coated 81 milliGRAM(s) Oral daily  benzonatate 100 milliGRAM(s) Oral three times a day  dextrose 5%. 1000 milliLiter(s) (50 mL/Hr) IV Continuous <Continuous>  dextrose 50% Injectable 12.5 Gram(s) IV Push once  dextrose 50% Injectable 25 Gram(s) IV Push once  dextrose 50% Injectable 25 Gram(s) IV Push once  enoxaparin Injectable 40 milliGRAM(s) SubCutaneous two times a day  insulin glargine Injectable (LANTUS) 44 Unit(s) SubCutaneous at bedtime  insulin lispro (HumaLOG) corrective regimen sliding scale   SubCutaneous at bedtime  insulin lispro (HumaLOG) corrective regimen sliding scale   SubCutaneous three times a day before meals  methylPREDNISolone sodium succinate Injectable 40 milliGRAM(s) IV Push every 12 hours  metoprolol succinate ER 25 milliGRAM(s) Oral daily  simvastatin 20 milliGRAM(s) Oral at bedtime    MEDICATIONS  (PRN):  acetaminophen   Tablet .. 650 milliGRAM(s) Oral every 6 hours PRN Temp greater or equal to 38C (100.4F)  ALBUTerol    90 MICROgram(s) HFA Inhaler 2 Puff(s) Inhalation every 6 hours PRN Shortness of Breath and/or Wheezing  dextrose 40% Gel 15 Gram(s) Oral once PRN Blood Glucose LESS THAN 70 milliGRAM(s)/deciliter  glucagon  Injectable 1 milliGRAM(s) IntraMuscular once PRN Glucose LESS THAN 70 milligrams/deciliter  guaiFENesin   Syrup  (Sugar-Free) 200 milliGRAM(s) Oral every 6 hours PRN Cough      CAPILLARY BLOOD GLUCOSE      POCT Blood Glucose.: 314 mg/dL (07 Apr 2020 11:52)  POCT Blood Glucose.: 238 mg/dL (07 Apr 2020 08:39)  POCT Blood Glucose.: 296 mg/dL (06 Apr 2020 21:31)    I&O's Summary      PHYSICAL EXAM:  Vital Signs Last 24 Hrs  T(C): 36.7 (07 Apr 2020 04:39), Max: 36.7 (07 Apr 2020 04:39)  T(F): 98 (07 Apr 2020 04:39), Max: 98 (07 Apr 2020 04:39)  HR: 72 (07 Apr 2020 04:39) (72 - 72)  BP: 152/76 (07 Apr 2020 04:39) (152/76 - 152/76)  BP(mean): --  RR: 20 (07 Apr 2020 04:39) (20 - 20)  SpO2: 92% (07 Apr 2020 04:39) (92% - 92%)  CONSTITUTIONAL: some labored breathing, speaking in short sentences  ENMT: Moist oral mucosa, no pharyngeal injection or exudates; normal dentition  RESPIRATORY: Normal respiratory effort; lungs are clear to auscultation bilaterally  CARDIOVASCULAR: Regular rate and rhythm, normal S1 and S2, no murmur/rub/gallop; No lower extremity edema; Peripheral pulses are 2+ bilaterally  ABDOMEN: Nontender to palpation, normoactive bowel sounds, no rebound/guarding; No hepatosplenomegaly    LABS:                        11.9   5.60  )-----------( 348      ( 06 Apr 2020 08:30 )             38.8     04-06    135  |  96<L>  |  15  ----------------------------<  265<H>  4.3   |  24  |  0.73    Ca    9.6      06 Apr 2020 08:30  Phos  3.6     04-06  Mg     2.3     04-06    TPro  8.1  /  Alb  3.5  /  TBili  0.5  /  DBili  x   /  AST  34<H>  /  ALT  24  /  AlkPhos  141<H>  04-06                RADIOLOGY & ADDITIONAL TESTS:  Results Reviewed:   Imaging Personally Reviewed:  Electrocardiogram Personally Reviewed:    COORDINATION OF CARE:  Care Discussed with Consultants/Other Providers [Y/N]:  Prior or Outpatient Records Reviewed [Y/N]:

## 2020-04-07 NOTE — PROGRESS NOTE ADULT - ASSESSMENT
COVID-19: still SOB ;requiring NRB; continue to monitor SpO2; benzonatate for cough; Plaquenil; Solumedrol, now trialing anakinra  Essential hypertension: well controlled  Coronary artery disease involving native heart without angina pectoris, unspecified vessel or lesion type: stable and asymptomatic  Type 2 diabetes mellitus with hyperglycemia, without long-term current use of insulin: sugars better controlled on increased lantus, continue to monitor

## 2020-04-08 LAB
ALBUMIN SERPL ELPH-MCNC: 3.7 G/DL — SIGNIFICANT CHANGE UP (ref 3.3–5)
ALP SERPL-CCNC: 150 U/L — HIGH (ref 40–120)
ALT FLD-CCNC: 14 U/L — SIGNIFICANT CHANGE UP (ref 4–33)
ANION GAP SERPL CALC-SCNC: 17 MMO/L — HIGH (ref 7–14)
AST SERPL-CCNC: 21 U/L — SIGNIFICANT CHANGE UP (ref 4–32)
BASOPHILS # BLD AUTO: 0.02 K/UL — SIGNIFICANT CHANGE UP (ref 0–0.2)
BASOPHILS NFR BLD AUTO: 0.1 % — SIGNIFICANT CHANGE UP (ref 0–2)
BASOPHILS NFR SPEC: 0 % — SIGNIFICANT CHANGE UP (ref 0–2)
BILIRUB SERPL-MCNC: 0.5 MG/DL — SIGNIFICANT CHANGE UP (ref 0.2–1.2)
BLASTS # FLD: 0 % — SIGNIFICANT CHANGE UP (ref 0–0)
BUN SERPL-MCNC: 23 MG/DL — SIGNIFICANT CHANGE UP (ref 7–23)
CALCIUM SERPL-MCNC: 9.9 MG/DL — SIGNIFICANT CHANGE UP (ref 8.4–10.5)
CHLORIDE SERPL-SCNC: 92 MMOL/L — LOW (ref 98–107)
CO2 SERPL-SCNC: 24 MMOL/L — SIGNIFICANT CHANGE UP (ref 22–31)
CREAT SERPL-MCNC: 0.79 MG/DL — SIGNIFICANT CHANGE UP (ref 0.5–1.3)
EOSINOPHIL # BLD AUTO: 0 K/UL — SIGNIFICANT CHANGE UP (ref 0–0.5)
EOSINOPHIL NFR BLD AUTO: 0 % — SIGNIFICANT CHANGE UP (ref 0–6)
EOSINOPHIL NFR FLD: 0 % — SIGNIFICANT CHANGE UP (ref 0–6)
FERRITIN SERPL-MCNC: 491.9 NG/ML — HIGH (ref 15–150)
GIANT PLATELETS BLD QL SMEAR: PRESENT — SIGNIFICANT CHANGE UP
GLUCOSE BLDC GLUCOMTR-MCNC: 170 MG/DL — HIGH (ref 70–99)
GLUCOSE BLDC GLUCOMTR-MCNC: 189 MG/DL — HIGH (ref 70–99)
GLUCOSE BLDC GLUCOMTR-MCNC: 289 MG/DL — HIGH (ref 70–99)
GLUCOSE BLDC GLUCOMTR-MCNC: 329 MG/DL — HIGH (ref 70–99)
GLUCOSE BLDC GLUCOMTR-MCNC: 385 MG/DL — HIGH (ref 70–99)
GLUCOSE SERPL-MCNC: 304 MG/DL — HIGH (ref 70–99)
HCT VFR BLD CALC: 41.4 % — SIGNIFICANT CHANGE UP (ref 34.5–45)
HGB BLD-MCNC: 13.1 G/DL — SIGNIFICANT CHANGE UP (ref 11.5–15.5)
IMM GRANULOCYTES NFR BLD AUTO: 1 % — SIGNIFICANT CHANGE UP (ref 0–1.5)
LDH SERPL L TO P-CCNC: 477 U/L — HIGH (ref 135–225)
LYMPHOCYTES # BLD AUTO: 0.91 K/UL — LOW (ref 1–3.3)
LYMPHOCYTES # BLD AUTO: 6.5 % — LOW (ref 13–44)
LYMPHOCYTES NFR SPEC AUTO: 2.6 % — LOW (ref 13–44)
MAGNESIUM SERPL-MCNC: 1.9 MG/DL — SIGNIFICANT CHANGE UP (ref 1.6–2.6)
MCHC RBC-ENTMCNC: 18.7 PG — LOW (ref 27–34)
MCHC RBC-ENTMCNC: 31.6 % — LOW (ref 32–36)
MCV RBC AUTO: 59 FL — LOW (ref 80–100)
METAMYELOCYTES # FLD: 0 % — SIGNIFICANT CHANGE UP (ref 0–1)
MONOCYTES # BLD AUTO: 0.66 K/UL — SIGNIFICANT CHANGE UP (ref 0–0.9)
MONOCYTES NFR BLD AUTO: 4.7 % — SIGNIFICANT CHANGE UP (ref 2–14)
MONOCYTES NFR BLD: 5.2 % — SIGNIFICANT CHANGE UP (ref 2–9)
MYELOCYTES NFR BLD: 0 % — SIGNIFICANT CHANGE UP (ref 0–0)
NEUTROPHIL AB SER-ACNC: 90.4 % — HIGH (ref 43–77)
NEUTROPHILS # BLD AUTO: 12.18 K/UL — HIGH (ref 1.8–7.4)
NEUTROPHILS NFR BLD AUTO: 87.7 % — HIGH (ref 43–77)
NEUTS BAND # BLD: 0 % — SIGNIFICANT CHANGE UP (ref 0–6)
NRBC # FLD: 0 K/UL — SIGNIFICANT CHANGE UP (ref 0–0)
OTHER - HEMATOLOGY %: 0 — SIGNIFICANT CHANGE UP
OVALOCYTES BLD QL SMEAR: SLIGHT — SIGNIFICANT CHANGE UP
PHOSPHATE SERPL-MCNC: 3.5 MG/DL — SIGNIFICANT CHANGE UP (ref 2.5–4.5)
PLATELET # BLD AUTO: 586 K/UL — HIGH (ref 150–400)
PLATELET COUNT - ESTIMATE: SIGNIFICANT CHANGE UP
PMV BLD: 9.5 FL — SIGNIFICANT CHANGE UP (ref 7–13)
POIKILOCYTOSIS BLD QL AUTO: SLIGHT — SIGNIFICANT CHANGE UP
POLYCHROMASIA BLD QL SMEAR: SLIGHT — SIGNIFICANT CHANGE UP
POTASSIUM SERPL-MCNC: 3.8 MMOL/L — SIGNIFICANT CHANGE UP (ref 3.5–5.3)
POTASSIUM SERPL-SCNC: 3.8 MMOL/L — SIGNIFICANT CHANGE UP (ref 3.5–5.3)
PROCALCITONIN SERPL-MCNC: 0.06 NG/ML — SIGNIFICANT CHANGE UP (ref 0.02–0.1)
PROMYELOCYTES # FLD: 0 % — SIGNIFICANT CHANGE UP (ref 0–0)
PROT SERPL-MCNC: 8.3 G/DL — SIGNIFICANT CHANGE UP (ref 6–8.3)
RBC # BLD: 7.02 M/UL — HIGH (ref 3.8–5.2)
RBC # FLD: 17.2 % — HIGH (ref 10.3–14.5)
REVIEW TO FOLLOW: YES — SIGNIFICANT CHANGE UP
SODIUM SERPL-SCNC: 133 MMOL/L — LOW (ref 135–145)
TARGETS BLD QL SMEAR: SLIGHT — SIGNIFICANT CHANGE UP
VARIANT LYMPHS # BLD: 1.8 % — SIGNIFICANT CHANGE UP
WBC # BLD: 13.91 K/UL — HIGH (ref 3.8–10.5)
WBC # FLD AUTO: 13.91 K/UL — HIGH (ref 3.8–10.5)

## 2020-04-08 PROCEDURE — 99233 SBSQ HOSP IP/OBS HIGH 50: CPT

## 2020-04-08 RX ORDER — POLYETHYLENE GLYCOL 3350 17 G/17G
17 POWDER, FOR SOLUTION ORAL DAILY
Refills: 0 | Status: DISCONTINUED | OUTPATIENT
Start: 2020-04-08 | End: 2020-04-17

## 2020-04-08 RX ORDER — INSULIN LISPRO 100/ML
10 VIAL (ML) SUBCUTANEOUS
Refills: 0 | Status: DISCONTINUED | OUTPATIENT
Start: 2020-04-08 | End: 2020-04-12

## 2020-04-08 RX ORDER — INSULIN GLARGINE 100 [IU]/ML
48 INJECTION, SOLUTION SUBCUTANEOUS AT BEDTIME
Refills: 0 | Status: DISCONTINUED | OUTPATIENT
Start: 2020-04-08 | End: 2020-04-09

## 2020-04-08 RX ADMIN — Medication 100 MILLIGRAM(S): at 21:04

## 2020-04-08 RX ADMIN — Medication 40 MILLIGRAM(S): at 06:33

## 2020-04-08 RX ADMIN — Medication 10 UNIT(S): at 18:33

## 2020-04-08 RX ADMIN — Medication 10 UNIT(S): at 14:32

## 2020-04-08 RX ADMIN — Medication 100 MILLIGRAM(S): at 14:32

## 2020-04-08 RX ADMIN — Medication 100 MILLIGRAM(S): at 23:18

## 2020-04-08 RX ADMIN — Medication 100 MILLIGRAM(S): at 14:31

## 2020-04-08 RX ADMIN — Medication 40 MILLIGRAM(S): at 18:33

## 2020-04-08 RX ADMIN — Medication 100 MILLIGRAM(S): at 06:33

## 2020-04-08 RX ADMIN — Medication 100 MILLIGRAM(S): at 06:32

## 2020-04-08 RX ADMIN — SIMVASTATIN 20 MILLIGRAM(S): 20 TABLET, FILM COATED ORAL at 21:04

## 2020-04-08 RX ADMIN — INSULIN GLARGINE 48 UNIT(S): 100 INJECTION, SOLUTION SUBCUTANEOUS at 22:38

## 2020-04-08 RX ADMIN — Medication 1: at 18:33

## 2020-04-08 RX ADMIN — Medication 81 MILLIGRAM(S): at 14:31

## 2020-04-08 RX ADMIN — ENOXAPARIN SODIUM 40 MILLIGRAM(S): 100 INJECTION SUBCUTANEOUS at 06:32

## 2020-04-08 RX ADMIN — ENOXAPARIN SODIUM 40 MILLIGRAM(S): 100 INJECTION SUBCUTANEOUS at 18:33

## 2020-04-08 RX ADMIN — Medication 4: at 09:38

## 2020-04-08 RX ADMIN — Medication 6 UNIT(S): at 09:39

## 2020-04-08 RX ADMIN — Medication 100 MILLIGRAM(S): at 00:16

## 2020-04-08 RX ADMIN — Medication 5: at 14:32

## 2020-04-08 RX ADMIN — Medication 100 MILLIGRAM(S): at 18:33

## 2020-04-08 NOTE — PROGRESS NOTE ADULT - ASSESSMENT
COVID-19:   - no significant improvement, still on NRB  - continue anakinra  - repeat inflammatory markers to gauge response    Essential hypertension: well controlled  Coronary artery disease involving native heart without angina pectoris, unspecified vessel or lesion type: stable and asymptomatic  Type 2 diabetes mellitus with hyperglycemia, without long-term current use of insulin: sugars better controlled on increased lantus, continue to monitor

## 2020-04-09 DIAGNOSIS — U07.1 COVID-19: ICD-10-CM

## 2020-04-09 LAB
ALBUMIN SERPL ELPH-MCNC: 3.6 G/DL — SIGNIFICANT CHANGE UP (ref 3.3–5)
ALP SERPL-CCNC: 146 U/L — HIGH (ref 40–120)
ALT FLD-CCNC: 13 U/L — SIGNIFICANT CHANGE UP (ref 4–33)
ANION GAP SERPL CALC-SCNC: 20 MMO/L — HIGH (ref 7–14)
AST SERPL-CCNC: 17 U/L — SIGNIFICANT CHANGE UP (ref 4–32)
BASOPHILS # BLD AUTO: 0.02 K/UL — SIGNIFICANT CHANGE UP (ref 0–0.2)
BASOPHILS NFR BLD AUTO: 0.1 % — SIGNIFICANT CHANGE UP (ref 0–2)
BILIRUB SERPL-MCNC: 0.6 MG/DL — SIGNIFICANT CHANGE UP (ref 0.2–1.2)
BUN SERPL-MCNC: 27 MG/DL — HIGH (ref 7–23)
CALCIUM SERPL-MCNC: 10.1 MG/DL — SIGNIFICANT CHANGE UP (ref 8.4–10.5)
CHLORIDE SERPL-SCNC: 93 MMOL/L — LOW (ref 98–107)
CO2 SERPL-SCNC: 22 MMOL/L — SIGNIFICANT CHANGE UP (ref 22–31)
CREAT SERPL-MCNC: 0.83 MG/DL — SIGNIFICANT CHANGE UP (ref 0.5–1.3)
CRP SERPL-MCNC: 9.7 MG/L — HIGH
EOSINOPHIL # BLD AUTO: 0 K/UL — SIGNIFICANT CHANGE UP (ref 0–0.5)
EOSINOPHIL NFR BLD AUTO: 0 % — SIGNIFICANT CHANGE UP (ref 0–6)
FERRITIN SERPL-MCNC: 415.7 NG/ML — HIGH (ref 15–150)
GLUCOSE BLDC GLUCOMTR-MCNC: 225 MG/DL — HIGH (ref 70–99)
GLUCOSE BLDC GLUCOMTR-MCNC: 237 MG/DL — HIGH (ref 70–99)
GLUCOSE SERPL-MCNC: 225 MG/DL — HIGH (ref 70–99)
HCT VFR BLD CALC: 43 % — SIGNIFICANT CHANGE UP (ref 34.5–45)
HGB BLD-MCNC: 13.2 G/DL — SIGNIFICANT CHANGE UP (ref 11.5–15.5)
IMM GRANULOCYTES NFR BLD AUTO: 1.4 % — SIGNIFICANT CHANGE UP (ref 0–1.5)
LDH SERPL L TO P-CCNC: 396 U/L — HIGH (ref 135–225)
LYMPHOCYTES # BLD AUTO: 1.28 K/UL — SIGNIFICANT CHANGE UP (ref 1–3.3)
LYMPHOCYTES # BLD AUTO: 9 % — LOW (ref 13–44)
MAGNESIUM SERPL-MCNC: 2.1 MG/DL — SIGNIFICANT CHANGE UP (ref 1.6–2.6)
MCHC RBC-ENTMCNC: 18.2 PG — LOW (ref 27–34)
MCHC RBC-ENTMCNC: 30.7 % — LOW (ref 32–36)
MCV RBC AUTO: 59.4 FL — LOW (ref 80–100)
MONOCYTES # BLD AUTO: 0.77 K/UL — SIGNIFICANT CHANGE UP (ref 0–0.9)
MONOCYTES NFR BLD AUTO: 5.4 % — SIGNIFICANT CHANGE UP (ref 2–14)
NEUTROPHILS # BLD AUTO: 11.99 K/UL — HIGH (ref 1.8–7.4)
NEUTROPHILS NFR BLD AUTO: 84.1 % — HIGH (ref 43–77)
NRBC # FLD: 0 K/UL — SIGNIFICANT CHANGE UP (ref 0–0)
PHOSPHATE SERPL-MCNC: 4 MG/DL — SIGNIFICANT CHANGE UP (ref 2.5–4.5)
PLATELET # BLD AUTO: 659 K/UL — HIGH (ref 150–400)
PMV BLD: 9.5 FL — SIGNIFICANT CHANGE UP (ref 7–13)
POTASSIUM SERPL-MCNC: 4.1 MMOL/L — SIGNIFICANT CHANGE UP (ref 3.5–5.3)
POTASSIUM SERPL-SCNC: 4.1 MMOL/L — SIGNIFICANT CHANGE UP (ref 3.5–5.3)
PROT SERPL-MCNC: 8.5 G/DL — HIGH (ref 6–8.3)
RBC # BLD: 7.24 M/UL — HIGH (ref 3.8–5.2)
RBC # FLD: 17.6 % — HIGH (ref 10.3–14.5)
SODIUM SERPL-SCNC: 135 MMOL/L — SIGNIFICANT CHANGE UP (ref 135–145)
WBC # BLD: 14.26 K/UL — HIGH (ref 3.8–10.5)
WBC # FLD AUTO: 14.26 K/UL — HIGH (ref 3.8–10.5)

## 2020-04-09 PROCEDURE — 99233 SBSQ HOSP IP/OBS HIGH 50: CPT

## 2020-04-09 RX ORDER — ANAKINRA 100MG/0.67
100 SYRINGE (ML) SUBCUTANEOUS
Refills: 0 | Status: COMPLETED | OUTPATIENT
Start: 2020-04-09 | End: 2020-04-11

## 2020-04-09 RX ORDER — INSULIN GLARGINE 100 [IU]/ML
50 INJECTION, SOLUTION SUBCUTANEOUS AT BEDTIME
Refills: 0 | Status: DISCONTINUED | OUTPATIENT
Start: 2020-04-09 | End: 2020-04-17

## 2020-04-09 RX ORDER — FUROSEMIDE 40 MG
40 TABLET ORAL DAILY
Refills: 0 | Status: DISCONTINUED | OUTPATIENT
Start: 2020-04-10 | End: 2020-04-14

## 2020-04-09 RX ADMIN — Medication 10 UNIT(S): at 10:45

## 2020-04-09 RX ADMIN — ENOXAPARIN SODIUM 40 MILLIGRAM(S): 100 INJECTION SUBCUTANEOUS at 04:33

## 2020-04-09 RX ADMIN — Medication 100 MILLIGRAM(S): at 18:18

## 2020-04-09 RX ADMIN — Medication 81 MILLIGRAM(S): at 12:59

## 2020-04-09 RX ADMIN — Medication 10 UNIT(S): at 12:56

## 2020-04-09 RX ADMIN — ENOXAPARIN SODIUM 40 MILLIGRAM(S): 100 INJECTION SUBCUTANEOUS at 18:22

## 2020-04-09 RX ADMIN — Medication 100 MILLIGRAM(S): at 04:33

## 2020-04-09 RX ADMIN — Medication 10 UNIT(S): at 18:22

## 2020-04-09 RX ADMIN — Medication 40 MILLIGRAM(S): at 04:33

## 2020-04-09 RX ADMIN — Medication 25 MILLIGRAM(S): at 04:33

## 2020-04-09 RX ADMIN — Medication 100 MILLIGRAM(S): at 12:59

## 2020-04-09 RX ADMIN — Medication 2: at 10:45

## 2020-04-09 RX ADMIN — POLYETHYLENE GLYCOL 3350 17 GRAM(S): 17 POWDER, FOR SOLUTION ORAL at 18:20

## 2020-04-09 RX ADMIN — Medication 2: at 12:57

## 2020-04-09 NOTE — PROGRESS NOTE ADULT - PROBLEM SELECTOR PLAN 1
still hypoxic requiring NRB, sating 94%  wean O2 as able   complete 3 day anakinra today, inflammatory markers down (CRP 9.7 from peak 77, Ferritin 491), will c/w anakinra 100 mg sc q12h x3 days, repeat inflammatory markers (ferritin, d-dimer, CRP) in 3 days to assess response still hypoxic requiring NRB, sating 94%  wean O2 as able   complete 3 day anakinra q6h dosing today, inflammatory markers down (CRP 9.7 from peak 77, Ferritin 491) and pt reports breathing better, will c/w anakinra 100 mg sc q12h x3 days, repeat inflammatory markers (ferritin, d-dimer, CRP) in 3 days to assess clinical response

## 2020-04-09 NOTE — PROGRESS NOTE ADULT - SUBJECTIVE AND OBJECTIVE BOX
Dr. Cici Ferrera  Pager [phone]    PROGRESS NOTE:     Patient is a 59y old  Female who presents with a chief complaint of hypoxic respiratory failure, COVID-19+ (08 Apr 2020 13:16)      SUBJECTIVE / OVERNIGHT EVENTS: pt breathing ok on NRB , sating 94%, no GI symptoms  ADDITIONAL REVIEW OF SYSTEMS: afebrile     MEDICATIONS  (STANDING):  anakinra Injectable 100 milliGRAM(s) SubCutaneous every 6 hours  anakinra Injectable 100 milliGRAM(s) SubCutaneous <User Schedule>  aspirin enteric coated 81 milliGRAM(s) Oral daily  benzonatate 100 milliGRAM(s) Oral three times a day  dextrose 5%. 1000 milliLiter(s) (50 mL/Hr) IV Continuous <Continuous>  dextrose 50% Injectable 12.5 Gram(s) IV Push once  dextrose 50% Injectable 25 Gram(s) IV Push once  dextrose 50% Injectable 25 Gram(s) IV Push once  enoxaparin Injectable 40 milliGRAM(s) SubCutaneous two times a day  furosemide   Injectable 40 milliGRAM(s) IV Push daily  insulin glargine Injectable (LANTUS) 48 Unit(s) SubCutaneous at bedtime  insulin lispro (HumaLOG) corrective regimen sliding scale   SubCutaneous three times a day before meals  insulin lispro (HumaLOG) corrective regimen sliding scale   SubCutaneous at bedtime  insulin lispro Injectable (HumaLOG) 10 Unit(s) SubCutaneous three times a day before meals  metoprolol succinate ER 25 milliGRAM(s) Oral daily  polyethylene glycol 3350 17 Gram(s) Oral daily  simvastatin 20 milliGRAM(s) Oral at bedtime    MEDICATIONS  (PRN):  acetaminophen   Tablet .. 650 milliGRAM(s) Oral every 6 hours PRN Temp greater or equal to 38C (100.4F)  ALBUTerol    90 MICROgram(s) HFA Inhaler 2 Puff(s) Inhalation every 6 hours PRN Shortness of Breath and/or Wheezing  dextrose 40% Gel 15 Gram(s) Oral once PRN Blood Glucose LESS THAN 70 milliGRAM(s)/deciliter  glucagon  Injectable 1 milliGRAM(s) IntraMuscular once PRN Glucose LESS THAN 70 milligrams/deciliter  guaiFENesin   Syrup  (Sugar-Free) 200 milliGRAM(s) Oral every 6 hours PRN Cough      CAPILLARY BLOOD GLUCOSE      POCT Blood Glucose.: 237 mg/dL (09 Apr 2020 10:42)  POCT Blood Glucose.: 225 mg/dL (09 Apr 2020 08:15)  POCT Blood Glucose.: 170 mg/dL (08 Apr 2020 22:28)  POCT Blood Glucose.: 189 mg/dL (08 Apr 2020 18:07)    I&O's Summary      PHYSICAL EXAM:  Vital Signs Last 24 Hrs  T(C): 36.8 (09 Apr 2020 04:30), Max: 36.8 (09 Apr 2020 04:30)  T(F): 98.3 (09 Apr 2020 04:30), Max: 98.3 (09 Apr 2020 04:30)  HR: 81 (09 Apr 2020 04:30) (81 - 81)  BP: 124/68 (09 Apr 2020 04:30) (124/68 - 124/68)  BP(mean): --  RR: 19 (09 Apr 2020 04:30) (19 - 19)  SpO2: 94% (09 Apr 2020 04:30) (94% - 94%)  CONSTITUTIONAL: NAD, well-developed  RESPIRATORY: decreased air entry on NRB  CARDIOVASCULAR: Regular rate and rhythm, normal S1 and S2, no murmur/rub/gallop; No lower extremity edema; Peripheral pulses are 2+ bilaterally  ABDOMEN: Nontender to palpation, normoactive bowel sounds, no rebound/guarding; No hepatosplenomegaly  MUSCULOSKELETAL: no clubbing or cyanosis of digits; no joint swelling or tenderness to palpation  PSYCH: A+O to person, place, and time; affect appropriate    LABS:                        13.2   14.26 )-----------( 659      ( 09 Apr 2020 06:35 )             43.0     04-09    135  |  93<L>  |  27<H>  ----------------------------<  225<H>  4.1   |  22  |  0.83    Ca    10.1      09 Apr 2020 06:35  Phos  4.0     04-09  Mg     2.1     04-09    TPro  8.5<H>  /  Alb  3.6  /  TBili  0.6  /  DBili  x   /  AST  17  /  ALT  13  /  AlkPhos  146<H>  04-09        RADIOLOGY & ADDITIONAL TESTS:  Results Reviewed:   Imaging Personally Reviewed:  Electrocardiogram Personally Reviewed:    COORDINATION OF CARE:  Care Discussed with Consultants/Other Providers [Y/N]:  Prior or Outpatient Records Reviewed [Y/N]:

## 2020-04-10 PROCEDURE — 99233 SBSQ HOSP IP/OBS HIGH 50: CPT

## 2020-04-10 PROCEDURE — 71045 X-RAY EXAM CHEST 1 VIEW: CPT | Mod: 26

## 2020-04-10 RX ORDER — ACETAMINOPHEN 500 MG
650 TABLET ORAL ONCE
Refills: 0 | Status: COMPLETED | OUTPATIENT
Start: 2020-04-10 | End: 2020-04-10

## 2020-04-10 RX ORDER — INSULIN GLARGINE 100 [IU]/ML
25 INJECTION, SOLUTION SUBCUTANEOUS ONCE
Refills: 0 | Status: COMPLETED | OUTPATIENT
Start: 2020-04-10 | End: 2020-04-10

## 2020-04-10 RX ADMIN — Medication 10 UNIT(S): at 18:04

## 2020-04-10 RX ADMIN — Medication 40 MILLIGRAM(S): at 18:03

## 2020-04-10 RX ADMIN — Medication 10 UNIT(S): at 12:19

## 2020-04-10 RX ADMIN — ENOXAPARIN SODIUM 40 MILLIGRAM(S): 100 INJECTION SUBCUTANEOUS at 06:06

## 2020-04-10 RX ADMIN — SIMVASTATIN 20 MILLIGRAM(S): 20 TABLET, FILM COATED ORAL at 01:12

## 2020-04-10 RX ADMIN — INSULIN GLARGINE 50 UNIT(S): 100 INJECTION, SOLUTION SUBCUTANEOUS at 22:07

## 2020-04-10 RX ADMIN — Medication 3: at 12:20

## 2020-04-10 RX ADMIN — Medication 100 MILLIGRAM(S): at 12:32

## 2020-04-10 RX ADMIN — Medication 10 UNIT(S): at 10:29

## 2020-04-10 RX ADMIN — Medication 100 MILLIGRAM(S): at 22:06

## 2020-04-10 RX ADMIN — Medication 25 MILLIGRAM(S): at 06:06

## 2020-04-10 RX ADMIN — Medication 650 MILLIGRAM(S): at 06:13

## 2020-04-10 RX ADMIN — POLYETHYLENE GLYCOL 3350 17 GRAM(S): 17 POWDER, FOR SOLUTION ORAL at 12:22

## 2020-04-10 RX ADMIN — Medication 100 MILLIGRAM(S): at 01:09

## 2020-04-10 RX ADMIN — Medication 1: at 18:04

## 2020-04-10 RX ADMIN — Medication 100 MILLIGRAM(S): at 12:18

## 2020-04-10 RX ADMIN — ENOXAPARIN SODIUM 40 MILLIGRAM(S): 100 INJECTION SUBCUTANEOUS at 18:05

## 2020-04-10 RX ADMIN — Medication 100 MILLIGRAM(S): at 05:29

## 2020-04-10 RX ADMIN — SIMVASTATIN 20 MILLIGRAM(S): 20 TABLET, FILM COATED ORAL at 22:07

## 2020-04-10 RX ADMIN — INSULIN GLARGINE 25 UNIT(S): 100 INJECTION, SOLUTION SUBCUTANEOUS at 06:47

## 2020-04-10 RX ADMIN — Medication 100 MILLIGRAM(S): at 01:11

## 2020-04-10 RX ADMIN — Medication 40 MILLIGRAM(S): at 06:08

## 2020-04-10 RX ADMIN — Medication 81 MILLIGRAM(S): at 12:18

## 2020-04-10 RX ADMIN — Medication 2: at 10:30

## 2020-04-10 RX ADMIN — Medication 40 MILLIGRAM(S): at 10:34

## 2020-04-10 NOTE — PROGRESS NOTE ADULT - PROBLEM SELECTOR PLAN 1
still hypoxic requiring NRB, sating 91%  wean O2 as able   c/w anakinra q12h taper x3 days, if response then 100 mg sc q24h x3 days  -, trend inflammatory markers (CRP, ferritin, d-dimer) q48-72h, CRP 77-->9.7, Ferritin 491) still hypoxic requiring NRB, sating 91%  wean O2 as able   c/w anakinra q12h taper x3 days, if response then 100 mg sc q24h x3 days  - trend inflammatory markers (CRP, ferritin, d-dimer) q48-72h, CRP 77-->9.7, Ferritin 491)  -repeat CXR today still hypoxic requiring NRB, sating 91%. wean O2 as able  -completed plaquenil and 3 days of solumedrol  -start solumedrol 40 mg bid x2 more days to complete 5 day course  - c/w anakinra q12h taper x3 days, if response then 100 mg sc q24h x3 days  - trend inflammatory markers (CRP, ferritin, d-dimer) q48-72h, CRP 77-->9.7, Ferritin 491)  -repeat CXR today

## 2020-04-10 NOTE — PROGRESS NOTE ADULT - SUBJECTIVE AND OBJECTIVE BOX
Dr. Cici Ferrera  Pager [phone]    PROGRESS NOTE:     Patient is a 59y old  Female who presents with a chief complaint of hypoxic respiratory failure, COVID-19+ (09 Apr 2020 14:09)      SUBJECTIVE / OVERNIGHT EVENTS: pt feels she is breathing a little better,   ADDITIONAL REVIEW OF SYSTEMS: remains on 100% NRB, sating 91%    MEDICATIONS  (STANDING):  anakinra Injectable 100 milliGRAM(s) SubCutaneous <User Schedule>  aspirin enteric coated 81 milliGRAM(s) Oral daily  benzonatate 100 milliGRAM(s) Oral three times a day  dextrose 5%. 1000 milliLiter(s) (50 mL/Hr) IV Continuous <Continuous>  dextrose 50% Injectable 12.5 Gram(s) IV Push once  dextrose 50% Injectable 25 Gram(s) IV Push once  dextrose 50% Injectable 25 Gram(s) IV Push once  enoxaparin Injectable 40 milliGRAM(s) SubCutaneous two times a day  furosemide    Tablet 40 milliGRAM(s) Oral daily  insulin glargine Injectable (LANTUS) 50 Unit(s) SubCutaneous at bedtime  insulin lispro (HumaLOG) corrective regimen sliding scale   SubCutaneous at bedtime  insulin lispro (HumaLOG) corrective regimen sliding scale   SubCutaneous three times a day before meals  insulin lispro Injectable (HumaLOG) 10 Unit(s) SubCutaneous three times a day before meals  methylPREDNISolone sodium succinate Injectable 40 milliGRAM(s) IV Push every 12 hours  metoprolol succinate ER 25 milliGRAM(s) Oral daily  polyethylene glycol 3350 17 Gram(s) Oral daily  simvastatin 20 milliGRAM(s) Oral at bedtime    MEDICATIONS  (PRN):  acetaminophen   Tablet .. 650 milliGRAM(s) Oral every 6 hours PRN Temp greater or equal to 38C (100.4F)  ALBUTerol    90 MICROgram(s) HFA Inhaler 2 Puff(s) Inhalation every 6 hours PRN Shortness of Breath and/or Wheezing  dextrose 40% Gel 15 Gram(s) Oral once PRN Blood Glucose LESS THAN 70 milliGRAM(s)/deciliter  glucagon  Injectable 1 milliGRAM(s) IntraMuscular once PRN Glucose LESS THAN 70 milligrams/deciliter  guaiFENesin   Syrup  (Sugar-Free) 200 milliGRAM(s) Oral every 6 hours PRN Cough      CAPILLARY BLOOD GLUCOSE      POCT Blood Glucose.: 258 mg/dL (10 Apr 2020 11:16)  POCT Blood Glucose.: 232 mg/dL (10 Apr 2020 09:16)  POCT Blood Glucose.: 149 mg/dL (10 Apr 2020 05:24)  POCT Blood Glucose.: 98 mg/dL (10 Apr 2020 01:04)  POCT Blood Glucose.: 131 mg/dL (09 Apr 2020 17:49)    I&O's Summary      PHYSICAL EXAM:  Vital Signs Last 24 Hrs  T(C): 36.4 (10 Apr 2020 10:40), Max: 36.8 (09 Apr 2020 15:11)  T(F): 97.6 (10 Apr 2020 10:40), Max: 98.2 (09 Apr 2020 15:11)  HR: 78 (10 Apr 2020 10:40) (78 - 88)  BP: 120/56 (10 Apr 2020 12:35) (91/41 - 126/69)  BP(mean): --  RR: 20 (10 Apr 2020 10:40) (20 - 20)  SpO2: 91% (10 Apr 2020 10:40) (91% - 92%)  CONSTITUTIONAL: NAD, well-developed  RESPIRATORY: Normal respiratory effort; lungs are clear to auscultation bilaterally  CARDIOVASCULAR: Regular rate and rhythm, normal S1 and S2, no murmur/rub/gallop; No lower extremity edema; Peripheral pulses are 2+ bilaterally  ABDOMEN: Nontender to palpation, normoactive bowel sounds, no rebound/guarding; No hepatosplenomegaly  MUSCULOSKELETAL: no clubbing or cyanosis of digits; no joint swelling or tenderness to palpation  PSYCH: A+O to person, place, and time; affect appropriate    LABS:                        13.2   14.26 )-----------( 659      ( 09 Apr 2020 06:35 )             43.0     04-09    135  |  93<L>  |  27<H>  ----------------------------<  225<H>  4.1   |  22  |  0.83    Ca    10.1      09 Apr 2020 06:35  Phos  4.0     04-09  Mg     2.1     04-09    TPro  8.5<H>  /  Alb  3.6  /  TBili  0.6  /  DBili  x   /  AST  17  /  ALT  13  /  AlkPhos  146<H>  04-09                RADIOLOGY & ADDITIONAL TESTS:  Results Reviewed:   Imaging Personally Reviewed:  Electrocardiogram Personally Reviewed:    COORDINATION OF CARE:  Care Discussed with Consultants/Other Providers [Y/N]: mary ellen Mosley inflammatory markers in am  Prior or Outpatient Records Reviewed [Y/N]:

## 2020-04-11 LAB
ALBUMIN SERPL ELPH-MCNC: 3.1 G/DL — LOW (ref 3.3–5)
ALP SERPL-CCNC: 111 U/L — SIGNIFICANT CHANGE UP (ref 40–120)
ALT FLD-CCNC: 10 U/L — SIGNIFICANT CHANGE UP (ref 4–33)
ANION GAP SERPL CALC-SCNC: 14 MMO/L — SIGNIFICANT CHANGE UP (ref 7–14)
AST SERPL-CCNC: 11 U/L — SIGNIFICANT CHANGE UP (ref 4–32)
BASOPHILS # BLD AUTO: 0.02 K/UL — SIGNIFICANT CHANGE UP (ref 0–0.2)
BASOPHILS NFR BLD AUTO: 0.2 % — SIGNIFICANT CHANGE UP (ref 0–2)
BILIRUB SERPL-MCNC: 0.4 MG/DL — SIGNIFICANT CHANGE UP (ref 0.2–1.2)
BUN SERPL-MCNC: 27 MG/DL — HIGH (ref 7–23)
CALCIUM SERPL-MCNC: 9.4 MG/DL — SIGNIFICANT CHANGE UP (ref 8.4–10.5)
CHLORIDE SERPL-SCNC: 93 MMOL/L — LOW (ref 98–107)
CO2 SERPL-SCNC: 24 MMOL/L — SIGNIFICANT CHANGE UP (ref 22–31)
CREAT SERPL-MCNC: 0.75 MG/DL — SIGNIFICANT CHANGE UP (ref 0.5–1.3)
CRP SERPL-MCNC: 41.9 MG/L — HIGH
D DIMER BLD IA.RAPID-MCNC: < 150 NG/ML — SIGNIFICANT CHANGE UP
EOSINOPHIL # BLD AUTO: 0 K/UL — SIGNIFICANT CHANGE UP (ref 0–0.5)
EOSINOPHIL NFR BLD AUTO: 0 % — SIGNIFICANT CHANGE UP (ref 0–6)
FERRITIN SERPL-MCNC: 432.9 NG/ML — HIGH (ref 15–150)
GLUCOSE SERPL-MCNC: 408 MG/DL — HIGH (ref 70–99)
HCT VFR BLD CALC: 38.2 % — SIGNIFICANT CHANGE UP (ref 34.5–45)
HGB BLD-MCNC: 11.6 G/DL — SIGNIFICANT CHANGE UP (ref 11.5–15.5)
IMM GRANULOCYTES NFR BLD AUTO: 1.5 % — SIGNIFICANT CHANGE UP (ref 0–1.5)
LYMPHOCYTES # BLD AUTO: 0.68 K/UL — LOW (ref 1–3.3)
LYMPHOCYTES # BLD AUTO: 5.8 % — LOW (ref 13–44)
MAGNESIUM SERPL-MCNC: 2.3 MG/DL — SIGNIFICANT CHANGE UP (ref 1.6–2.6)
MCHC RBC-ENTMCNC: 18 PG — LOW (ref 27–34)
MCHC RBC-ENTMCNC: 30.4 % — LOW (ref 32–36)
MCV RBC AUTO: 59.3 FL — LOW (ref 80–100)
MONOCYTES # BLD AUTO: 0.48 K/UL — SIGNIFICANT CHANGE UP (ref 0–0.9)
MONOCYTES NFR BLD AUTO: 4.1 % — SIGNIFICANT CHANGE UP (ref 2–14)
NEUTROPHILS # BLD AUTO: 10.37 K/UL — HIGH (ref 1.8–7.4)
NEUTROPHILS NFR BLD AUTO: 88.4 % — HIGH (ref 43–77)
NRBC # FLD: 0 K/UL — SIGNIFICANT CHANGE UP (ref 0–0)
PHOSPHATE SERPL-MCNC: 2.9 MG/DL — SIGNIFICANT CHANGE UP (ref 2.5–4.5)
PLATELET # BLD AUTO: 588 K/UL — HIGH (ref 150–400)
PMV BLD: 9 FL — SIGNIFICANT CHANGE UP (ref 7–13)
POTASSIUM SERPL-MCNC: 4.5 MMOL/L — SIGNIFICANT CHANGE UP (ref 3.5–5.3)
POTASSIUM SERPL-SCNC: 4.5 MMOL/L — SIGNIFICANT CHANGE UP (ref 3.5–5.3)
PROT SERPL-MCNC: 7.2 G/DL — SIGNIFICANT CHANGE UP (ref 6–8.3)
RBC # BLD: 6.44 M/UL — HIGH (ref 3.8–5.2)
RBC # FLD: 15.8 % — HIGH (ref 10.3–14.5)
SODIUM SERPL-SCNC: 131 MMOL/L — LOW (ref 135–145)
WBC # BLD: 11.73 K/UL — HIGH (ref 3.8–10.5)
WBC # FLD AUTO: 11.73 K/UL — HIGH (ref 3.8–10.5)

## 2020-04-11 PROCEDURE — 99233 SBSQ HOSP IP/OBS HIGH 50: CPT

## 2020-04-11 RX ADMIN — Medication 100 MILLIGRAM(S): at 22:30

## 2020-04-11 RX ADMIN — Medication 10 UNIT(S): at 17:47

## 2020-04-11 RX ADMIN — ENOXAPARIN SODIUM 40 MILLIGRAM(S): 100 INJECTION SUBCUTANEOUS at 17:48

## 2020-04-11 RX ADMIN — Medication 10 UNIT(S): at 08:34

## 2020-04-11 RX ADMIN — ENOXAPARIN SODIUM 40 MILLIGRAM(S): 100 INJECTION SUBCUTANEOUS at 05:13

## 2020-04-11 RX ADMIN — Medication 40 MILLIGRAM(S): at 17:48

## 2020-04-11 RX ADMIN — INSULIN GLARGINE 50 UNIT(S): 100 INJECTION, SOLUTION SUBCUTANEOUS at 22:31

## 2020-04-11 RX ADMIN — POLYETHYLENE GLYCOL 3350 17 GRAM(S): 17 POWDER, FOR SOLUTION ORAL at 12:23

## 2020-04-11 RX ADMIN — Medication 4: at 17:47

## 2020-04-11 RX ADMIN — Medication 5: at 12:24

## 2020-04-11 RX ADMIN — Medication 100 MILLIGRAM(S): at 05:14

## 2020-04-11 RX ADMIN — Medication 40 MILLIGRAM(S): at 05:13

## 2020-04-11 RX ADMIN — Medication 3: at 08:34

## 2020-04-11 RX ADMIN — Medication 10 UNIT(S): at 12:24

## 2020-04-11 RX ADMIN — Medication 100 MILLIGRAM(S): at 08:34

## 2020-04-11 RX ADMIN — SIMVASTATIN 20 MILLIGRAM(S): 20 TABLET, FILM COATED ORAL at 22:30

## 2020-04-11 RX ADMIN — Medication 100 MILLIGRAM(S): at 22:35

## 2020-04-11 RX ADMIN — Medication 81 MILLIGRAM(S): at 12:23

## 2020-04-11 RX ADMIN — Medication 100 MILLIGRAM(S): at 12:23

## 2020-04-11 NOTE — PROGRESS NOTE ADULT - SUBJECTIVE AND OBJECTIVE BOX
Patient is a 59y old  Female who presents with a chief complaint of hypoxic respiratory failure, COVID-19+ (10 Apr 2020 13:22)      SUBJECTIVE / OVERNIGHT EVENTS: Pt states she feels a little better, cough diminished.    MEDICATIONS  (STANDING):  anakinra Injectable 100 milliGRAM(s) SubCutaneous <User Schedule>  aspirin enteric coated 81 milliGRAM(s) Oral daily  benzonatate 100 milliGRAM(s) Oral three times a day  dextrose 5%. 1000 milliLiter(s) (50 mL/Hr) IV Continuous <Continuous>  dextrose 50% Injectable 12.5 Gram(s) IV Push once  dextrose 50% Injectable 25 Gram(s) IV Push once  dextrose 50% Injectable 25 Gram(s) IV Push once  enoxaparin Injectable 40 milliGRAM(s) SubCutaneous two times a day  furosemide    Tablet 40 milliGRAM(s) Oral daily  insulin glargine Injectable (LANTUS) 50 Unit(s) SubCutaneous at bedtime  insulin lispro (HumaLOG) corrective regimen sliding scale   SubCutaneous at bedtime  insulin lispro (HumaLOG) corrective regimen sliding scale   SubCutaneous three times a day before meals  insulin lispro Injectable (HumaLOG) 10 Unit(s) SubCutaneous three times a day before meals  methylPREDNISolone sodium succinate Injectable 40 milliGRAM(s) IV Push every 12 hours  metoprolol succinate ER 25 milliGRAM(s) Oral daily  polyethylene glycol 3350 17 Gram(s) Oral daily  simvastatin 20 milliGRAM(s) Oral at bedtime    MEDICATIONS  (PRN):  acetaminophen   Tablet .. 650 milliGRAM(s) Oral every 6 hours PRN Temp greater or equal to 38C (100.4F)  ALBUTerol    90 MICROgram(s) HFA Inhaler 2 Puff(s) Inhalation every 6 hours PRN Shortness of Breath and/or Wheezing  dextrose 40% Gel 15 Gram(s) Oral once PRN Blood Glucose LESS THAN 70 milliGRAM(s)/deciliter  glucagon  Injectable 1 milliGRAM(s) IntraMuscular once PRN Glucose LESS THAN 70 milligrams/deciliter  guaiFENesin   Syrup  (Sugar-Free) 200 milliGRAM(s) Oral every 6 hours PRN Cough      CAPILLARY BLOOD GLUCOSE      POCT Blood Glucose.: 378 mg/dL (11 Apr 2020 12:11)  POCT Blood Glucose.: 258 mg/dL (11 Apr 2020 08:28)  POCT Blood Glucose.: 228 mg/dL (10 Apr 2020 21:54)  POCT Blood Glucose.: 154 mg/dL (10 Apr 2020 16:46)    I&O's Summary      PHYSICAL EXAM:  Vital Signs Last 24 Hrs  T(C): 36.7 (11 Apr 2020 05:18), Max: 36.7 (10 Apr 2020 21:48)  T(F): 98.1 (11 Apr 2020 05:18), Max: 98.1 (11 Apr 2020 05:18)  HR: 83 (11 Apr 2020 05:18) (82 - 83)  BP: 104/53 (11 Apr 2020 05:18) (104/53 - 120/56)  BP(mean): --  RR: 20 (11 Apr 2020 05:18) (20 - 20)  SpO2: 92% (11 Apr 2020 05:18) (92% - 95%)  CONSTITUTIONAL: appears fatigued, but speaking in full sentences  ENMT: Moist oral mucosa, no pharyngeal injection or exudates; normal dentition  RESPIRATORY: coarse BS b/l  CARDIOVASCULAR: Regular rate and rhythm, normal S1 and S2, no murmur/rub/gallop; No lower extremity edema; Peripheral pulses are 2+ bilaterally  ABDOMEN: Nontender to palpation, normoactive bowel sounds, no rebound/guarding; No hepatosplenomegaly

## 2020-04-11 NOTE — PROGRESS NOTE ADULT - PROBLEM SELECTOR PLAN 1
still hypoxic requiring NRB, sating low 90s.  -completed plaquenil, to complete solumedrol 40 mg bid 5 day course  - c/w anakinra q12h taper x3 days, if response then 100 mg sc q24h x3 days  - trend inflammatory markers (CRP, ferritin, d-dimer) q48-72h, CRP 77-->9.7, Ferritin 491)

## 2020-04-12 PROCEDURE — 99233 SBSQ HOSP IP/OBS HIGH 50: CPT

## 2020-04-12 RX ORDER — INSULIN LISPRO 100/ML
16 VIAL (ML) SUBCUTANEOUS
Refills: 0 | Status: DISCONTINUED | OUTPATIENT
Start: 2020-04-12 | End: 2020-04-17

## 2020-04-12 RX ORDER — ANAKINRA 100MG/0.67
100 SYRINGE (ML) SUBCUTANEOUS DAILY
Refills: 0 | Status: COMPLETED | OUTPATIENT
Start: 2020-04-12 | End: 2020-04-15

## 2020-04-12 RX ADMIN — ENOXAPARIN SODIUM 40 MILLIGRAM(S): 100 INJECTION SUBCUTANEOUS at 05:23

## 2020-04-12 RX ADMIN — Medication 25 MILLIGRAM(S): at 05:22

## 2020-04-12 RX ADMIN — Medication 40 MILLIGRAM(S): at 05:22

## 2020-04-12 RX ADMIN — SIMVASTATIN 20 MILLIGRAM(S): 20 TABLET, FILM COATED ORAL at 23:28

## 2020-04-12 RX ADMIN — Medication 100 MILLIGRAM(S): at 23:27

## 2020-04-12 RX ADMIN — INSULIN GLARGINE 50 UNIT(S): 100 INJECTION, SOLUTION SUBCUTANEOUS at 23:27

## 2020-04-12 RX ADMIN — Medication 3: at 09:04

## 2020-04-12 RX ADMIN — Medication 81 MILLIGRAM(S): at 12:46

## 2020-04-12 RX ADMIN — ENOXAPARIN SODIUM 40 MILLIGRAM(S): 100 INJECTION SUBCUTANEOUS at 17:40

## 2020-04-12 RX ADMIN — Medication 10 UNIT(S): at 12:46

## 2020-04-12 RX ADMIN — POLYETHYLENE GLYCOL 3350 17 GRAM(S): 17 POWDER, FOR SOLUTION ORAL at 12:47

## 2020-04-12 RX ADMIN — Medication 1: at 17:28

## 2020-04-12 RX ADMIN — Medication 100 MILLIGRAM(S): at 05:22

## 2020-04-12 RX ADMIN — Medication 100 MILLIGRAM(S): at 18:27

## 2020-04-12 RX ADMIN — Medication 10 UNIT(S): at 09:04

## 2020-04-12 RX ADMIN — Medication 4: at 12:46

## 2020-04-12 NOTE — PROGRESS NOTE ADULT - SUBJECTIVE AND OBJECTIVE BOX
Patient is a 59y old  Female who presents with a chief complaint of hypoxic respiratory failure, COVID-19+ (11 Apr 2020 12:15)      SUBJECTIVE / OVERNIGHT EVENTS: Pt feeling somewhat better today. Appetite improving.    MEDICATIONS  (STANDING):  aspirin enteric coated 81 milliGRAM(s) Oral daily  benzonatate 100 milliGRAM(s) Oral three times a day  dextrose 5%. 1000 milliLiter(s) (50 mL/Hr) IV Continuous <Continuous>  dextrose 50% Injectable 12.5 Gram(s) IV Push once  dextrose 50% Injectable 25 Gram(s) IV Push once  dextrose 50% Injectable 25 Gram(s) IV Push once  enoxaparin Injectable 40 milliGRAM(s) SubCutaneous two times a day  furosemide    Tablet 40 milliGRAM(s) Oral daily  insulin glargine Injectable (LANTUS) 50 Unit(s) SubCutaneous at bedtime  insulin lispro (HumaLOG) corrective regimen sliding scale   SubCutaneous at bedtime  insulin lispro (HumaLOG) corrective regimen sliding scale   SubCutaneous three times a day before meals  insulin lispro Injectable (HumaLOG) 10 Unit(s) SubCutaneous three times a day before meals  metoprolol succinate ER 25 milliGRAM(s) Oral daily  polyethylene glycol 3350 17 Gram(s) Oral daily  simvastatin 20 milliGRAM(s) Oral at bedtime    MEDICATIONS  (PRN):  acetaminophen   Tablet .. 650 milliGRAM(s) Oral every 6 hours PRN Temp greater or equal to 38C (100.4F)  ALBUTerol    90 MICROgram(s) HFA Inhaler 2 Puff(s) Inhalation every 6 hours PRN Shortness of Breath and/or Wheezing  dextrose 40% Gel 15 Gram(s) Oral once PRN Blood Glucose LESS THAN 70 milliGRAM(s)/deciliter  glucagon  Injectable 1 milliGRAM(s) IntraMuscular once PRN Glucose LESS THAN 70 milligrams/deciliter  guaiFENesin   Syrup  (Sugar-Free) 200 milliGRAM(s) Oral every 6 hours PRN Cough      CAPILLARY BLOOD GLUCOSE      POCT Blood Glucose.: 259 mg/dL (12 Apr 2020 08:49)  POCT Blood Glucose.: 230 mg/dL (11 Apr 2020 21:51)  POCT Blood Glucose.: 316 mg/dL (11 Apr 2020 17:41)  POCT Blood Glucose.: 378 mg/dL (11 Apr 2020 12:11)    I&O's Summary      PHYSICAL EXAM:  Vital Signs Last 24 Hrs  T(C): 36.4 (12 Apr 2020 09:04), Max: 36.6 (11 Apr 2020 15:03)  T(F): 97.6 (12 Apr 2020 09:04), Max: 97.9 (11 Apr 2020 15:03)  HR: 58 (12 Apr 2020 09:04) (58 - 78)  BP: 117/60 (12 Apr 2020 09:04) (112/76 - 121/65)  BP(mean): --  RR: 20 (12 Apr 2020 09:04) (20 - 23)  SpO2: 95% (12 Apr 2020 09:04) (93% - 96%)  CONSTITUTIONAL: NAD, well-developed, well-groomed  ENMT: Moist oral mucosa, no pharyngeal injection or exudates; normal dentition  RESPIRATORY: coarse BS b/l  CARDIOVASCULAR: Regular rate and rhythm, normal S1 and S2, no murmur/rub/gallop; No lower extremity edema; Peripheral pulses are 2+ bilaterally  ABDOMEN: Nontender to palpation, normoactive bowel sounds, no rebound/guarding; No hepatosplenomegaly      LABS:                        11.6   11.73 )-----------( 588      ( 11 Apr 2020 11:30 )             38.2     04-11    131<L>  |  93<L>  |  27<H>  ----------------------------<  408<H>  4.5   |  24  |  0.75    Ca    9.4      11 Apr 2020 11:30  Phos  2.9     04-11  Mg     2.3     04-11    TPro  7.2  /  Alb  3.1<L>  /  TBili  0.4  /  DBili  x   /  AST  11  /  ALT  10  /  AlkPhos  111  04-11                RADIOLOGY & ADDITIONAL TESTS:  Results Reviewed:   Imaging Personally Reviewed:  Electrocardiogram Personally Reviewed:    COORDINATION OF CARE:  Care Discussed with Consultants/Other Providers [Y/N]:  Prior or Outpatient Records Reviewed [Y/N]:

## 2020-04-12 NOTE — PROGRESS NOTE ADULT - PROBLEM SELECTOR PLAN 1
still hypoxic requiring NRB, but now improved SpO2  -completed plaquenil, to complete solumedrol 40 mg bid 5 day course  - c/w anakinra daily x 3 days  - will attempt to titrate down O2 today

## 2020-04-13 LAB
ALBUMIN SERPL ELPH-MCNC: 3.1 G/DL — LOW (ref 3.3–5)
ALP SERPL-CCNC: 107 U/L — SIGNIFICANT CHANGE UP (ref 40–120)
ALT FLD-CCNC: 12 U/L — SIGNIFICANT CHANGE UP (ref 4–33)
ANION GAP SERPL CALC-SCNC: 14 MMO/L — SIGNIFICANT CHANGE UP (ref 7–14)
AST SERPL-CCNC: 12 U/L — SIGNIFICANT CHANGE UP (ref 4–32)
BASOPHILS # BLD AUTO: 0.03 K/UL — SIGNIFICANT CHANGE UP (ref 0–0.2)
BASOPHILS NFR BLD AUTO: 0.3 % — SIGNIFICANT CHANGE UP (ref 0–2)
BILIRUB SERPL-MCNC: 0.5 MG/DL — SIGNIFICANT CHANGE UP (ref 0.2–1.2)
BUN SERPL-MCNC: 27 MG/DL — HIGH (ref 7–23)
CALCIUM SERPL-MCNC: 9.5 MG/DL — SIGNIFICANT CHANGE UP (ref 8.4–10.5)
CHLORIDE SERPL-SCNC: 97 MMOL/L — LOW (ref 98–107)
CO2 SERPL-SCNC: 25 MMOL/L — SIGNIFICANT CHANGE UP (ref 22–31)
CREAT SERPL-MCNC: 0.79 MG/DL — SIGNIFICANT CHANGE UP (ref 0.5–1.3)
CRP SERPL-MCNC: 7.4 MG/L — HIGH
D DIMER BLD IA.RAPID-MCNC: < 150 NG/ML — SIGNIFICANT CHANGE UP
EOSINOPHIL # BLD AUTO: 0.02 K/UL — SIGNIFICANT CHANGE UP (ref 0–0.5)
EOSINOPHIL NFR BLD AUTO: 0.2 % — SIGNIFICANT CHANGE UP (ref 0–6)
FERRITIN SERPL-MCNC: 417.4 NG/ML — HIGH (ref 15–150)
GLUCOSE SERPL-MCNC: 127 MG/DL — HIGH (ref 70–99)
HCT VFR BLD CALC: 40 % — SIGNIFICANT CHANGE UP (ref 34.5–45)
HGB BLD-MCNC: 12.6 G/DL — SIGNIFICANT CHANGE UP (ref 11.5–15.5)
IMM GRANULOCYTES NFR BLD AUTO: 1.3 % — SIGNIFICANT CHANGE UP (ref 0–1.5)
LDH SERPL L TO P-CCNC: 222 U/L — SIGNIFICANT CHANGE UP (ref 135–225)
LYMPHOCYTES # BLD AUTO: 1.76 K/UL — SIGNIFICANT CHANGE UP (ref 1–3.3)
LYMPHOCYTES # BLD AUTO: 16.4 % — SIGNIFICANT CHANGE UP (ref 13–44)
MAGNESIUM SERPL-MCNC: 2.3 MG/DL — SIGNIFICANT CHANGE UP (ref 1.6–2.6)
MANUAL SMEAR VERIFICATION: SIGNIFICANT CHANGE UP
MCHC RBC-ENTMCNC: 18.9 PG — LOW (ref 27–34)
MCHC RBC-ENTMCNC: 31.5 % — LOW (ref 32–36)
MCV RBC AUTO: 59.9 FL — LOW (ref 80–100)
MONOCYTES # BLD AUTO: 0.95 K/UL — HIGH (ref 0–0.9)
MONOCYTES NFR BLD AUTO: 8.9 % — SIGNIFICANT CHANGE UP (ref 2–14)
NEUTROPHILS # BLD AUTO: 7.83 K/UL — HIGH (ref 1.8–7.4)
NEUTROPHILS NFR BLD AUTO: 72.9 % — SIGNIFICANT CHANGE UP (ref 43–77)
NRBC # FLD: 0 K/UL — SIGNIFICANT CHANGE UP (ref 0–0)
PHOSPHATE SERPL-MCNC: 2.8 MG/DL — SIGNIFICANT CHANGE UP (ref 2.5–4.5)
PLATELET # BLD AUTO: 633 K/UL — HIGH (ref 150–400)
PMV BLD: 9.2 FL — SIGNIFICANT CHANGE UP (ref 7–13)
POTASSIUM SERPL-MCNC: 4.9 MMOL/L — SIGNIFICANT CHANGE UP (ref 3.5–5.3)
POTASSIUM SERPL-SCNC: 4.9 MMOL/L — SIGNIFICANT CHANGE UP (ref 3.5–5.3)
PROT SERPL-MCNC: 6.8 G/DL — SIGNIFICANT CHANGE UP (ref 6–8.3)
RBC # BLD: 6.68 M/UL — HIGH (ref 3.8–5.2)
RBC # FLD: 17 % — HIGH (ref 10.3–14.5)
SODIUM SERPL-SCNC: 136 MMOL/L — SIGNIFICANT CHANGE UP (ref 135–145)
WBC # BLD: 10.73 K/UL — HIGH (ref 3.8–10.5)
WBC # FLD AUTO: 10.73 K/UL — HIGH (ref 3.8–10.5)

## 2020-04-13 PROCEDURE — 99233 SBSQ HOSP IP/OBS HIGH 50: CPT

## 2020-04-13 PROCEDURE — 93010 ELECTROCARDIOGRAM REPORT: CPT

## 2020-04-13 RX ADMIN — Medication 100 MILLIGRAM(S): at 22:02

## 2020-04-13 RX ADMIN — Medication 81 MILLIGRAM(S): at 12:04

## 2020-04-13 RX ADMIN — Medication 16 UNIT(S): at 08:58

## 2020-04-13 RX ADMIN — ENOXAPARIN SODIUM 40 MILLIGRAM(S): 100 INJECTION SUBCUTANEOUS at 17:38

## 2020-04-13 RX ADMIN — SIMVASTATIN 20 MILLIGRAM(S): 20 TABLET, FILM COATED ORAL at 22:03

## 2020-04-13 RX ADMIN — Medication 100 MILLIGRAM(S): at 12:04

## 2020-04-13 RX ADMIN — Medication 25 MILLIGRAM(S): at 09:16

## 2020-04-13 RX ADMIN — Medication 100 MILLIGRAM(S): at 05:06

## 2020-04-13 RX ADMIN — ENOXAPARIN SODIUM 40 MILLIGRAM(S): 100 INJECTION SUBCUTANEOUS at 05:06

## 2020-04-13 RX ADMIN — INSULIN GLARGINE 50 UNIT(S): 100 INJECTION, SOLUTION SUBCUTANEOUS at 22:02

## 2020-04-13 RX ADMIN — Medication 16 UNIT(S): at 12:03

## 2020-04-13 RX ADMIN — Medication 100 MILLIGRAM(S): at 12:05

## 2020-04-13 RX ADMIN — Medication 40 MILLIGRAM(S): at 05:06

## 2020-04-13 RX ADMIN — POLYETHYLENE GLYCOL 3350 17 GRAM(S): 17 POWDER, FOR SOLUTION ORAL at 12:05

## 2020-04-13 NOTE — DIETITIAN INITIAL EVALUATION ADULT. - OTHER INFO
Patient is a 60 y/o female with PMH HTN, DM (on insulin), CAD s/p CABG present with three days of cough, shortness of breath and chills. Also endorsing a frontal headache, nasal congestion, nausea and vomiting. Admitted for hypoxic respiratory failure due to COVID-19 positive on 4/2/20. Patient is a 60 y/o female with PMH HTN, DM (on insulin), CAD s/p CABG present with three days of cough, shortness of breath and chills. Also endorsing a frontal headache, nasal congestion, nausea and vomiting. Admitted for hypoxic respiratory failure due to COVID-19 positive on 4/2/20.    Unable to conduct a face to face interview or nutrition-focused physical exam due to limited contact restrictions related to pt's medical condition and isolation precautions. RD spoke with pt's daughter via phone ([phone]). Per daughter, patient's appetite has been poor x 3 days prior to admission due to above symptoms. Per chart review, pt's appetite improved, and is taking Glucerna shake x3 daily. No chewing/swallowing difficulties reported. No beef/pork per daughter -- honored. Also daughter requested to send soup and tea at lunch and dinner for patient. HgbA1C 11.8% 4/3/20, +hx of DM on insulin, daughter reports that patient does normally has high carbohydrate intake at home but is trying to choose whole grain products. RD explained the importance of portion size control, and provided written DM diet education sent via email, including carbohydrate food list, carb counting, and label reading.

## 2020-04-13 NOTE — DIETITIAN INITIAL EVALUATION ADULT. - PERTINENT LABORATORY DATA
04-13 Na136 mmol/L Glu 127 mg/dL<H> K+ 4.9 mmol/L Cr  0.79 mg/dL BUN 27 mg/dL<H> 04-13 Phos 2.8 mg/dL 04-13 Alb 3.1 g/dL<L> 04-03 [id number] 11.8 %<H>

## 2020-04-13 NOTE — DIETITIAN INITIAL EVALUATION ADULT. - PERTINENT MEDS FT
MEDICATIONS  (STANDING):  anakinra Injectable 100 milliGRAM(s) SubCutaneous daily  aspirin enteric coated 81 milliGRAM(s) Oral daily  benzonatate 100 milliGRAM(s) Oral three times a day  dextrose 5%. 1000 milliLiter(s) (50 mL/Hr) IV Continuous <Continuous>  dextrose 50% Injectable 12.5 Gram(s) IV Push once  dextrose 50% Injectable 25 Gram(s) IV Push once  dextrose 50% Injectable 25 Gram(s) IV Push once  enoxaparin Injectable 40 milliGRAM(s) SubCutaneous two times a day  furosemide    Tablet 40 milliGRAM(s) Oral daily  insulin glargine Injectable (LANTUS) 50 Unit(s) SubCutaneous at bedtime  insulin lispro (HumaLOG) corrective regimen sliding scale   SubCutaneous at bedtime  insulin lispro (HumaLOG) corrective regimen sliding scale   SubCutaneous three times a day before meals  insulin lispro Injectable (HumaLOG) 16 Unit(s) SubCutaneous three times a day before meals  metoprolol succinate ER 25 milliGRAM(s) Oral daily  polyethylene glycol 3350 17 Gram(s) Oral daily  simvastatin 20 milliGRAM(s) Oral at bedtime    MEDICATIONS  (PRN):  acetaminophen   Tablet .. 650 milliGRAM(s) Oral every 6 hours PRN Temp greater or equal to 38C (100.4F)  ALBUTerol    90 MICROgram(s) HFA Inhaler 2 Puff(s) Inhalation every 6 hours PRN Shortness of Breath and/or Wheezing  dextrose 40% Gel 15 Gram(s) Oral once PRN Blood Glucose LESS THAN 70 milliGRAM(s)/deciliter  glucagon  Injectable 1 milliGRAM(s) IntraMuscular once PRN Glucose LESS THAN 70 milligrams/deciliter  guaiFENesin   Syrup  (Sugar-Free) 200 milliGRAM(s) Oral every 6 hours PRN Cough

## 2020-04-13 NOTE — PROGRESS NOTE ADULT - PROBLEM SELECTOR PLAN 1
still hypoxic requiring NRB, but now improved SpO2  -completed plaquenil and solumedrol x 5 day course   - c/w anakinra taper daily x 3 days through 4/15  -inflammatory markers trending down (crp 7.4, d-dimer<150, ferritin 417), trend q3d  - will attempt to titrate down O2 today still hypoxic requiring NRB, but now improved SpO2  -completed plaquenil and solumedrol x 5 day course   - c/w anakinra taper daily x 3 days through 4/15  -inflammatory markers trending down (crp 7.4, d-dimer<150, ferritin 417), trend q3d  - will attempt to titrate down O2 today  -GOC: d/w pt and daughter, remains full code

## 2020-04-13 NOTE — PROVIDER CONTACT NOTE (OTHER) - ACTION/TREATMENT ORDERED:
MD at bedside assessed patient  . EKG ordered . metoprolol given. as per EKG patient HR 78. patient is comfortable in bed. will continue to monitor.

## 2020-04-13 NOTE — PROGRESS NOTE ADULT - PROBLEM SELECTOR PLAN 2
increased lantus to 50 U hs and c/w humalog 10 U ac  trend FS, glycemic control acceptable  A1c 11.8

## 2020-04-13 NOTE — PROVIDER CONTACT NOTE (OTHER) - ASSESSMENT
patient is A&OX4 on  10L NRB mask. spo2 of 95% patient is calm , denies chest pain or SOB. patient is eating breakfast. patient apical pulse does not match electronic reading.

## 2020-04-13 NOTE — DIETITIAN INITIAL EVALUATION ADULT. - ADD RECOMMEND
1. Continue current diet order, which remains appropriate at this time. 2. Monitor weights, labs, BM's, skin integrity, PO intake/tolerance. 3. Please Encourage po intake, assist with meals and menu selections, provide alternatives PRN. 4. Reinforce diet education as needed.

## 2020-04-13 NOTE — PROGRESS NOTE ADULT - SUBJECTIVE AND OBJECTIVE BOX
Dr. Cici Ferrera  Pager [phone]    PROGRESS NOTE:     Patient is a 59y old  Female who presents with a chief complaint of hypoxic respiratory failure, COVID-19+ (12 Apr 2020 11:45)      SUBJECTIVE / OVERNIGHT EVENTS: pt reports she's breathing better  ADDITIONAL REVIEW OF SYSTEMS: tachycardic this am, EKG HR 73, sinus rhythm; pt denies chest pain/sob/palpitation/dizziness     MEDICATIONS  (STANDING):  anakinra Injectable 100 milliGRAM(s) SubCutaneous daily  aspirin enteric coated 81 milliGRAM(s) Oral daily  benzonatate 100 milliGRAM(s) Oral three times a day  dextrose 5%. 1000 milliLiter(s) (50 mL/Hr) IV Continuous <Continuous>  dextrose 50% Injectable 12.5 Gram(s) IV Push once  dextrose 50% Injectable 25 Gram(s) IV Push once  dextrose 50% Injectable 25 Gram(s) IV Push once  enoxaparin Injectable 40 milliGRAM(s) SubCutaneous two times a day  furosemide    Tablet 40 milliGRAM(s) Oral daily  insulin glargine Injectable (LANTUS) 50 Unit(s) SubCutaneous at bedtime  insulin lispro (HumaLOG) corrective regimen sliding scale   SubCutaneous at bedtime  insulin lispro (HumaLOG) corrective regimen sliding scale   SubCutaneous three times a day before meals  insulin lispro Injectable (HumaLOG) 16 Unit(s) SubCutaneous three times a day before meals  metoprolol succinate ER 25 milliGRAM(s) Oral daily  polyethylene glycol 3350 17 Gram(s) Oral daily  simvastatin 20 milliGRAM(s) Oral at bedtime    MEDICATIONS  (PRN):  acetaminophen   Tablet .. 650 milliGRAM(s) Oral every 6 hours PRN Temp greater or equal to 38C (100.4F)  ALBUTerol    90 MICROgram(s) HFA Inhaler 2 Puff(s) Inhalation every 6 hours PRN Shortness of Breath and/or Wheezing  dextrose 40% Gel 15 Gram(s) Oral once PRN Blood Glucose LESS THAN 70 milliGRAM(s)/deciliter  glucagon  Injectable 1 milliGRAM(s) IntraMuscular once PRN Glucose LESS THAN 70 milligrams/deciliter  guaiFENesin   Syrup  (Sugar-Free) 200 milliGRAM(s) Oral every 6 hours PRN Cough      CAPILLARY BLOOD GLUCOSE      POCT Blood Glucose.: 128 mg/dL (13 Apr 2020 11:27)  POCT Blood Glucose.: 103 mg/dL (13 Apr 2020 08:25)  POCT Blood Glucose.: 238 mg/dL (12 Apr 2020 22:47)  POCT Blood Glucose.: 190 mg/dL (12 Apr 2020 16:43)    I&O's Summary      PHYSICAL EXAM:  Vital Signs Last 24 Hrs  T(C): 36.4 (13 Apr 2020 04:52), Max: 36.6 (12 Apr 2020 23:45)  T(F): 97.5 (13 Apr 2020 04:52), Max: 97.8 (12 Apr 2020 23:45)  HR: 180 (13 Apr 2020 09:03) (60 - 180)  BP: 128/59 (13 Apr 2020 09:03) (102/59 - 128/59)  BP(mean): --  RR: 20 (13 Apr 2020 09:03) (19 - 20)  SpO2: 94% (13 Apr 2020 09:03) (94% - 100%)  CONSTITUTIONAL: NAD, well-developed  RESPIRATORY: decreased air entry  CARDIOVASCULAR: Regular rate and rhythm, normal S1 and S2, no murmur/rub/gallop; No lower extremity edema; Peripheral pulses are 2+ bilaterally  ABDOMEN: Nontender to palpation, normoactive bowel sounds, no rebound/guarding; No hepatosplenomegaly  MUSCULOSKELETAL: no clubbing or cyanosis of digits; no joint swelling or tenderness to palpation  PSYCH: A+O to person, place, and time; affect appropriate    LABS:                        12.6   10.73 )-----------( 633      ( 13 Apr 2020 03:48 )             40.0     04-13    136  |  97<L>  |  27<H>  ----------------------------<  127<H>  4.9   |  25  |  0.79    Ca    9.5      13 Apr 2020 03:48  Phos  2.8     04-13  Mg     2.3     04-13    TPro  6.8  /  Alb  3.1<L>  /  TBili  0.5  /  DBili  x   /  AST  12  /  ALT  12  /  AlkPhos  107  04-13      RADIOLOGY & ADDITIONAL TESTS:  Results Reviewed:   Imaging Personally Reviewed:   Electrocardiogram Personally Reviewed:    COORDINATION OF CARE:  Care Discussed with Consultants/Other Providers [Y/N]: cherrie Hermosillo pt tachy[place]ia, however, EKG unremarkable   Prior or Outpatient Records Reviewed [Y/N]:

## 2020-04-13 NOTE — PROVIDER CONTACT NOTE (OTHER) - RECOMMENDATIONS
notify  MD. give carbohydrates  apple juice X 1 encourage patient to eat dinner. re assess blood sugar

## 2020-04-14 LAB
ALBUMIN SERPL ELPH-MCNC: 3.3 G/DL — SIGNIFICANT CHANGE UP (ref 3.3–5)
ALP SERPL-CCNC: 105 U/L — SIGNIFICANT CHANGE UP (ref 40–120)
ALT FLD-CCNC: 12 U/L — SIGNIFICANT CHANGE UP (ref 4–33)
ANION GAP SERPL CALC-SCNC: 9 MMO/L — SIGNIFICANT CHANGE UP (ref 7–14)
AST SERPL-CCNC: 14 U/L — SIGNIFICANT CHANGE UP (ref 4–32)
BILIRUB SERPL-MCNC: 0.6 MG/DL — SIGNIFICANT CHANGE UP (ref 0.2–1.2)
BUN SERPL-MCNC: 25 MG/DL — HIGH (ref 7–23)
CALCIUM SERPL-MCNC: 9.9 MG/DL — SIGNIFICANT CHANGE UP (ref 8.4–10.5)
CHLORIDE SERPL-SCNC: 94 MMOL/L — LOW (ref 98–107)
CO2 SERPL-SCNC: 33 MMOL/L — HIGH (ref 22–31)
CREAT SERPL-MCNC: 0.84 MG/DL — SIGNIFICANT CHANGE UP (ref 0.5–1.3)
GLUCOSE SERPL-MCNC: 147 MG/DL — HIGH (ref 70–99)
HCT VFR BLD CALC: 43.4 % — SIGNIFICANT CHANGE UP (ref 34.5–45)
HGB BLD-MCNC: 13.4 G/DL — SIGNIFICANT CHANGE UP (ref 11.5–15.5)
MCHC RBC-ENTMCNC: 18.7 PG — LOW (ref 27–34)
MCHC RBC-ENTMCNC: 30.9 % — LOW (ref 32–36)
MCV RBC AUTO: 60.5 FL — LOW (ref 80–100)
NRBC # FLD: 0 K/UL — SIGNIFICANT CHANGE UP (ref 0–0)
PLATELET # BLD AUTO: 618 K/UL — HIGH (ref 150–400)
PMV BLD: 9.1 FL — SIGNIFICANT CHANGE UP (ref 7–13)
POTASSIUM SERPL-MCNC: 5 MMOL/L — SIGNIFICANT CHANGE UP (ref 3.5–5.3)
POTASSIUM SERPL-SCNC: 5 MMOL/L — SIGNIFICANT CHANGE UP (ref 3.5–5.3)
PROT SERPL-MCNC: 7.2 G/DL — SIGNIFICANT CHANGE UP (ref 6–8.3)
RBC # BLD: 7.17 M/UL — HIGH (ref 3.8–5.2)
RBC # FLD: 17.5 % — HIGH (ref 10.3–14.5)
SODIUM SERPL-SCNC: 136 MMOL/L — SIGNIFICANT CHANGE UP (ref 135–145)
WBC # BLD: 9 K/UL — SIGNIFICANT CHANGE UP (ref 3.8–10.5)
WBC # FLD AUTO: 9 K/UL — SIGNIFICANT CHANGE UP (ref 3.8–10.5)

## 2020-04-14 PROCEDURE — 99233 SBSQ HOSP IP/OBS HIGH 50: CPT

## 2020-04-14 RX ADMIN — Medication 1: at 12:04

## 2020-04-14 RX ADMIN — Medication 16 UNIT(S): at 10:04

## 2020-04-14 RX ADMIN — Medication 100 MILLIGRAM(S): at 04:25

## 2020-04-14 RX ADMIN — ENOXAPARIN SODIUM 40 MILLIGRAM(S): 100 INJECTION SUBCUTANEOUS at 04:08

## 2020-04-14 RX ADMIN — Medication 100 MILLIGRAM(S): at 13:17

## 2020-04-14 RX ADMIN — Medication 16 UNIT(S): at 17:31

## 2020-04-14 RX ADMIN — Medication 81 MILLIGRAM(S): at 12:04

## 2020-04-14 RX ADMIN — ENOXAPARIN SODIUM 40 MILLIGRAM(S): 100 INJECTION SUBCUTANEOUS at 17:31

## 2020-04-14 RX ADMIN — POLYETHYLENE GLYCOL 3350 17 GRAM(S): 17 POWDER, FOR SOLUTION ORAL at 12:04

## 2020-04-14 RX ADMIN — Medication 100 MILLIGRAM(S): at 13:31

## 2020-04-14 RX ADMIN — Medication 16 UNIT(S): at 12:04

## 2020-04-14 RX ADMIN — Medication 1: at 10:03

## 2020-04-14 NOTE — PROGRESS NOTE ADULT - SUBJECTIVE AND OBJECTIVE BOX
Dr. Cici Ferrera  Pager [phone]    PROGRESS NOTE:     Patient is a 59y old  Female who presents with a chief complaint of hypoxic respiratory failure, COVID-19+ (13 Apr 2020 12:24)      SUBJECTIVE / OVERNIGHT EVENTS: pt breathing better, sating well on 5 L NRB, weaned off NRB this am, now on NC 6L  ADDITIONAL REVIEW OF SYSTEMS: Afebrile    MEDICATIONS  (STANDING):  anakinra Injectable 100 milliGRAM(s) SubCutaneous daily  aspirin enteric coated 81 milliGRAM(s) Oral daily  benzonatate 100 milliGRAM(s) Oral three times a day  dextrose 5%. 1000 milliLiter(s) (50 mL/Hr) IV Continuous <Continuous>  dextrose 50% Injectable 12.5 Gram(s) IV Push once  dextrose 50% Injectable 25 Gram(s) IV Push once  dextrose 50% Injectable 25 Gram(s) IV Push once  enoxaparin Injectable 40 milliGRAM(s) SubCutaneous two times a day  insulin glargine Injectable (LANTUS) 50 Unit(s) SubCutaneous at bedtime  insulin lispro (HumaLOG) corrective regimen sliding scale   SubCutaneous at bedtime  insulin lispro (HumaLOG) corrective regimen sliding scale   SubCutaneous three times a day before meals  insulin lispro Injectable (HumaLOG) 16 Unit(s) SubCutaneous three times a day before meals  metoprolol succinate ER 25 milliGRAM(s) Oral daily  polyethylene glycol 3350 17 Gram(s) Oral daily  simvastatin 20 milliGRAM(s) Oral at bedtime    MEDICATIONS  (PRN):  acetaminophen   Tablet .. 650 milliGRAM(s) Oral every 6 hours PRN Temp greater or equal to 38C (100.4F)  ALBUTerol    90 MICROgram(s) HFA Inhaler 2 Puff(s) Inhalation every 6 hours PRN Shortness of Breath and/or Wheezing  dextrose 40% Gel 15 Gram(s) Oral once PRN Blood Glucose LESS THAN 70 milliGRAM(s)/deciliter  glucagon  Injectable 1 milliGRAM(s) IntraMuscular once PRN Glucose LESS THAN 70 milligrams/deciliter  guaiFENesin   Syrup  (Sugar-Free) 200 milliGRAM(s) Oral every 6 hours PRN Cough      CAPILLARY BLOOD GLUCOSE      POCT Blood Glucose.: 169 mg/dL (14 Apr 2020 08:14)  POCT Blood Glucose.: 221 mg/dL (13 Apr 2020 21:46)  POCT Blood Glucose.: 121 mg/dL (13 Apr 2020 18:23)  POCT Blood Glucose.: 62 mg/dL (13 Apr 2020 17:30)  POCT Blood Glucose.: 128 mg/dL (13 Apr 2020 11:27)    I&O's Summary    13 Apr 2020 07:01  -  14 Apr 2020 07:00  --------------------------------------------------------  IN: 320 mL / OUT: 650 mL / NET: -330 mL        PHYSICAL EXAM:  Vital Signs Last 24 Hrs  T(C): 36.5 (14 Apr 2020 10:00), Max: 36.7 (13 Apr 2020 20:55)  T(F): 97.7 (14 Apr 2020 10:00), Max: 98 (13 Apr 2020 20:55)  HR: 69 (14 Apr 2020 10:00) (67 - 69)  BP: 109/51 (14 Apr 2020 10:00) (99/54 - 109/51)  BP(mean): --  RR: 16 (14 Apr 2020 11:04) (16 - 20)  SpO2: 95% (14 Apr 2020 11:04) (94% - 98%)  CONSTITUTIONAL: NAD, well-developed  RESPIRATORY: mainly clear  CARDIOVASCULAR: Regular rate and rhythm, normal S1 and S2, no murmur/rub/gallop; No lower extremity edema; Peripheral pulses are 2+ bilaterally  ABDOMEN: Nontender to palpation, normoactive bowel sounds, no rebound/guarding; No hepatosplenomegaly  MUSCULOSKELETAL: no clubbing or cyanosis of digits; no joint swelling or tenderness to palpation  PSYCH: A+O to person, place, and time; affect appropriate    LABS:                        13.4   9.00  )-----------( 618      ( 14 Apr 2020 06:05 )             43.4     04-14    136  |  94<L>  |  25<H>  ----------------------------<  147<H>  5.0   |  33<H>  |  0.84    Ca    9.9      14 Apr 2020 06:05  Phos  2.8     04-13  Mg     2.3     04-13    TPro  7.2  /  Alb  3.3  /  TBili  0.6  /  DBili  x   /  AST  14  /  ALT  12  /  AlkPhos  105  04-14                RADIOLOGY & ADDITIONAL TESTS:  Results Reviewed:   Imaging Personally Reviewed:    Electrocardiogram Personally Reviewed:    COORDINATION OF CARE:  Care Discussed with Consultants/Other Providers [Y/N]: cherrie Hermosillo, weaned O2 to NC  Prior or Outpatient Records Reviewed [Y/N]:

## 2020-04-14 NOTE — PROGRESS NOTE ADULT - PROBLEM SELECTOR PLAN 1
-still hypoxic but clinically improving, breathing better  -weaned off NRB, O2 via NC, titrate as tolerated, titrated down from 6 L to 4L, sating 95%  -completed plaquenil and solumedrol x 5 day course   - c/w anakinra taper daily x 3 days through 4/15  -inflammatory markers trending down (crp 7.4, d-dimer<150, ferritin 417), trend q3d  - cont to titrate down O2 as tolerated  -GOC: d/w pt and daughter, remains full code -acute hypoxic respiratory failure with hypoxia d/t covid infection with bilateral infiltrates on imaging  -still hypoxic but clinically improving, breathing better  -weaned off NRB, O2 via NC, titrate as tolerated, titrated down from 6 L to 4L, sating 95%  -completed plaquenil and solumedrol x 5 day course   - c/w anakinra taper daily x 3 days through 4/15  -inflammatory markers trending down (crp 7.4, d-dimer<150, ferritin 417), trend q3d  - cont to titrate down O2 as tolerated  -GOC: d/w pt and daughter, remains full code -acute hypoxic respiratory failure with hypoxia d/t covid infection with bilateral infiltrates on imaging, c/w acute viral pneumonia  -still hypoxic but clinically improving, breathing better  -weaned off NRB, O2 via NC, titrate as tolerated, titrated down from 6 L to 4L, sating 95%  -completed plaquenil and solumedrol x 5 day course   - c/w anakinra taper daily x 3 days through 4/15  -inflammatory markers trending down (crp 7.4, d-dimer<150, ferritin 417), trend q3d  - cont to titrate down O2 as tolerated  -GOC: d/w pt and daughter, remains full code

## 2020-04-15 LAB
ALBUMIN SERPL ELPH-MCNC: 3.2 G/DL — LOW (ref 3.3–5)
ALP SERPL-CCNC: 101 U/L — SIGNIFICANT CHANGE UP (ref 40–120)
ALT FLD-CCNC: 14 U/L — SIGNIFICANT CHANGE UP (ref 4–33)
ANION GAP SERPL CALC-SCNC: 18 MMO/L — HIGH (ref 7–14)
AST SERPL-CCNC: 27 U/L — SIGNIFICANT CHANGE UP (ref 4–32)
BILIRUB SERPL-MCNC: 0.6 MG/DL — SIGNIFICANT CHANGE UP (ref 0.2–1.2)
BUN SERPL-MCNC: 22 MG/DL — SIGNIFICANT CHANGE UP (ref 7–23)
CALCIUM SERPL-MCNC: 9.7 MG/DL — SIGNIFICANT CHANGE UP (ref 8.4–10.5)
CHLORIDE SERPL-SCNC: 97 MMOL/L — LOW (ref 98–107)
CO2 SERPL-SCNC: 20 MMOL/L — LOW (ref 22–31)
CREAT SERPL-MCNC: 0.8 MG/DL — SIGNIFICANT CHANGE UP (ref 0.5–1.3)
CRP SERPL-MCNC: < 4 MG/L — SIGNIFICANT CHANGE UP
D DIMER BLD IA.RAPID-MCNC: < 150 NG/ML — SIGNIFICANT CHANGE UP
FERRITIN SERPL-MCNC: 440 NG/ML — HIGH (ref 15–150)
GLUCOSE SERPL-MCNC: 147 MG/DL — HIGH (ref 70–99)
HCT VFR BLD CALC: 43.9 % — SIGNIFICANT CHANGE UP (ref 34.5–45)
HGB BLD-MCNC: 13.2 G/DL — SIGNIFICANT CHANGE UP (ref 11.5–15.5)
MCHC RBC-ENTMCNC: 18.4 PG — LOW (ref 27–34)
MCHC RBC-ENTMCNC: 30.1 % — LOW (ref 32–36)
MCV RBC AUTO: 61.1 FL — LOW (ref 80–100)
NRBC # FLD: 0.02 K/UL — SIGNIFICANT CHANGE UP (ref 0–0)
PLATELET # BLD AUTO: 371 K/UL — SIGNIFICANT CHANGE UP (ref 150–400)
PMV BLD: 10.1 FL — SIGNIFICANT CHANGE UP (ref 7–13)
POTASSIUM SERPL-MCNC: 4.9 MMOL/L — SIGNIFICANT CHANGE UP (ref 3.5–5.3)
POTASSIUM SERPL-SCNC: 4.9 MMOL/L — SIGNIFICANT CHANGE UP (ref 3.5–5.3)
PROT SERPL-MCNC: 7.2 G/DL — SIGNIFICANT CHANGE UP (ref 6–8.3)
RBC # BLD: 7.1 M/UL — HIGH (ref 3.8–5.2)
RBC # FLD: 17.8 % — HIGH (ref 10.3–14.5)
SODIUM SERPL-SCNC: 135 MMOL/L — SIGNIFICANT CHANGE UP (ref 135–145)
WBC # BLD: 8.42 K/UL — SIGNIFICANT CHANGE UP (ref 3.8–10.5)
WBC # FLD AUTO: 8.42 K/UL — SIGNIFICANT CHANGE UP (ref 3.8–10.5)

## 2020-04-15 PROCEDURE — 99233 SBSQ HOSP IP/OBS HIGH 50: CPT

## 2020-04-15 RX ADMIN — Medication 3: at 08:31

## 2020-04-15 RX ADMIN — Medication 100 MILLIGRAM(S): at 00:01

## 2020-04-15 RX ADMIN — Medication 16 UNIT(S): at 13:20

## 2020-04-15 RX ADMIN — Medication 100 MILLIGRAM(S): at 05:24

## 2020-04-15 RX ADMIN — Medication 100 MILLIGRAM(S): at 21:11

## 2020-04-15 RX ADMIN — SIMVASTATIN 20 MILLIGRAM(S): 20 TABLET, FILM COATED ORAL at 21:11

## 2020-04-15 RX ADMIN — ENOXAPARIN SODIUM 40 MILLIGRAM(S): 100 INJECTION SUBCUTANEOUS at 05:24

## 2020-04-15 RX ADMIN — Medication 100 MILLIGRAM(S): at 11:56

## 2020-04-15 RX ADMIN — Medication 16 UNIT(S): at 08:31

## 2020-04-15 RX ADMIN — INSULIN GLARGINE 50 UNIT(S): 100 INJECTION, SOLUTION SUBCUTANEOUS at 00:01

## 2020-04-15 RX ADMIN — INSULIN GLARGINE 50 UNIT(S): 100 INJECTION, SOLUTION SUBCUTANEOUS at 22:36

## 2020-04-15 RX ADMIN — SIMVASTATIN 20 MILLIGRAM(S): 20 TABLET, FILM COATED ORAL at 00:01

## 2020-04-15 RX ADMIN — Medication 100 MILLIGRAM(S): at 11:55

## 2020-04-15 RX ADMIN — Medication 16 UNIT(S): at 18:13

## 2020-04-15 RX ADMIN — Medication 2: at 13:19

## 2020-04-15 RX ADMIN — ENOXAPARIN SODIUM 40 MILLIGRAM(S): 100 INJECTION SUBCUTANEOUS at 18:13

## 2020-04-15 RX ADMIN — Medication 81 MILLIGRAM(S): at 11:55

## 2020-04-15 NOTE — PROGRESS NOTE ADULT - SUBJECTIVE AND OBJECTIVE BOX
Dr. Cici Ferrera  Pager [phone]    PROGRESS NOTE:     Patient is a 59y old  Female who presents with a chief complaint of hypoxic respiratory failure, COVID-19+ (14 Apr 2020 11:26)      SUBJECTIVE / OVERNIGHT EVENTS: pt continues to improve clinically, now on 2 L NC, sating 96%  ADDITIONAL REVIEW OF SYSTEMS: appetite improving    MEDICATIONS  (STANDING):  aspirin enteric coated 81 milliGRAM(s) Oral daily  benzonatate 100 milliGRAM(s) Oral three times a day  dextrose 5%. 1000 milliLiter(s) (50 mL/Hr) IV Continuous <Continuous>  dextrose 50% Injectable 12.5 Gram(s) IV Push once  dextrose 50% Injectable 25 Gram(s) IV Push once  dextrose 50% Injectable 25 Gram(s) IV Push once  enoxaparin Injectable 40 milliGRAM(s) SubCutaneous two times a day  insulin glargine Injectable (LANTUS) 50 Unit(s) SubCutaneous at bedtime  insulin lispro (HumaLOG) corrective regimen sliding scale   SubCutaneous at bedtime  insulin lispro (HumaLOG) corrective regimen sliding scale   SubCutaneous three times a day before meals  insulin lispro Injectable (HumaLOG) 16 Unit(s) SubCutaneous three times a day before meals  metoprolol succinate ER 25 milliGRAM(s) Oral daily  polyethylene glycol 3350 17 Gram(s) Oral daily  simvastatin 20 milliGRAM(s) Oral at bedtime    MEDICATIONS  (PRN):  acetaminophen   Tablet .. 650 milliGRAM(s) Oral every 6 hours PRN Temp greater or equal to 38C (100.4F)  ALBUTerol    90 MICROgram(s) HFA Inhaler 2 Puff(s) Inhalation every 6 hours PRN Shortness of Breath and/or Wheezing  dextrose 40% Gel 15 Gram(s) Oral once PRN Blood Glucose LESS THAN 70 milliGRAM(s)/deciliter  glucagon  Injectable 1 milliGRAM(s) IntraMuscular once PRN Glucose LESS THAN 70 milligrams/deciliter  guaiFENesin   Syrup  (Sugar-Free) 200 milliGRAM(s) Oral every 6 hours PRN Cough      CAPILLARY BLOOD GLUCOSE      POCT Blood Glucose.: 263 mg/dL (15 Apr 2020 08:04)  POCT Blood Glucose.: 136 mg/dL (14 Apr 2020 21:52)  POCT Blood Glucose.: 103 mg/dL (14 Apr 2020 16:53)    I&O's Summary      PHYSICAL EXAM:  Vital Signs Last 24 Hrs  T(C): 36.6 (14 Apr 2020 22:18), Max: 36.6 (14 Apr 2020 22:18)  T(F): 97.8 (14 Apr 2020 22:18), Max: 97.8 (14 Apr 2020 22:18)  HR: 49 (14 Apr 2020 22:18) (49 - 49)  BP: 104/55 (14 Apr 2020 22:18) (104/55 - 104/55)  BP(mean): --  RR: 18 (15 Apr 2020 08:30) (18 - 18)  SpO2: 95% (15 Apr 2020 08:30) (95% - 96%)  CONSTITUTIONAL: NAD, well-developed  RESPIRATORY: Normal respiratory effort; lungs are clear to auscultation bilaterally  CARDIOVASCULAR: Regular rate and rhythm, normal S1 and S2, no murmur/rub/gallop; No lower extremity edema; Peripheral pulses are 2+ bilaterally  ABDOMEN: Nontender to palpation, normoactive bowel sounds, no rebound/guarding; No hepatosplenomegaly  MUSCULOSKELETAL: no clubbing or cyanosis of digits; no joint swelling or tenderness to palpation  PSYCH: A+O to person, place, and time; affect appropriate    LABS:                        13.2   8.42  )-----------( 371      ( 15 Apr 2020 06:00 )             43.9     04-15    135  |  97<L>  |  22  ----------------------------<  147<H>  4.9   |  20<L>  |  0.80    Ca    9.7      15 Apr 2020 06:00    TPro  7.2  /  Alb  3.2<L>  /  TBili  0.6  /  DBili  x   /  AST  27  /  ALT  14  /  AlkPhos  101  04-15      RADIOLOGY & ADDITIONAL TESTS:  Results Reviewed:   Imaging Personally Reviewed:  Electrocardiogram Personally Reviewed:    COORDINATION OF CARE:  Care Discussed with Consultants/Other Providers [Y/N]: cherrie Hermosillo, wean O2 as tolerated, d/c planning when ambulatory O2 sat >90-92%  Prior or Outpatient Records Reviewed [Y/N]:

## 2020-04-16 LAB
ALBUMIN SERPL ELPH-MCNC: 3 G/DL — LOW (ref 3.3–5)
ALP SERPL-CCNC: 95 U/L — SIGNIFICANT CHANGE UP (ref 40–120)
ALT FLD-CCNC: 16 U/L — SIGNIFICANT CHANGE UP (ref 4–33)
ANION GAP SERPL CALC-SCNC: 13 MMO/L — SIGNIFICANT CHANGE UP (ref 7–14)
AST SERPL-CCNC: 16 U/L — SIGNIFICANT CHANGE UP (ref 4–32)
BILIRUB SERPL-MCNC: 0.5 MG/DL — SIGNIFICANT CHANGE UP (ref 0.2–1.2)
BUN SERPL-MCNC: 17 MG/DL — SIGNIFICANT CHANGE UP (ref 7–23)
CALCIUM SERPL-MCNC: 9.3 MG/DL — SIGNIFICANT CHANGE UP (ref 8.4–10.5)
CHLORIDE SERPL-SCNC: 99 MMOL/L — SIGNIFICANT CHANGE UP (ref 98–107)
CO2 SERPL-SCNC: 22 MMOL/L — SIGNIFICANT CHANGE UP (ref 22–31)
CREAT SERPL-MCNC: 0.84 MG/DL — SIGNIFICANT CHANGE UP (ref 0.5–1.3)
GLUCOSE BLDC GLUCOMTR-MCNC: 133 MG/DL — HIGH (ref 70–99)
GLUCOSE BLDC GLUCOMTR-MCNC: 157 MG/DL — HIGH (ref 70–99)
GLUCOSE BLDC GLUCOMTR-MCNC: 225 MG/DL — HIGH (ref 70–99)
GLUCOSE SERPL-MCNC: 161 MG/DL — HIGH (ref 70–99)
HCT VFR BLD CALC: 40.2 % — SIGNIFICANT CHANGE UP (ref 34.5–45)
HGB BLD-MCNC: 12.3 G/DL — SIGNIFICANT CHANGE UP (ref 11.5–15.5)
MCHC RBC-ENTMCNC: 18.4 PG — LOW (ref 27–34)
MCHC RBC-ENTMCNC: 30.6 % — LOW (ref 32–36)
MCV RBC AUTO: 60 FL — LOW (ref 80–100)
NRBC # FLD: 0 K/UL — SIGNIFICANT CHANGE UP (ref 0–0)
PLATELET # BLD AUTO: 494 K/UL — HIGH (ref 150–400)
PMV BLD: 8.9 FL — SIGNIFICANT CHANGE UP (ref 7–13)
POTASSIUM SERPL-MCNC: 4.6 MMOL/L — SIGNIFICANT CHANGE UP (ref 3.5–5.3)
POTASSIUM SERPL-SCNC: 4.6 MMOL/L — SIGNIFICANT CHANGE UP (ref 3.5–5.3)
PROT SERPL-MCNC: 7.2 G/DL — SIGNIFICANT CHANGE UP (ref 6–8.3)
RBC # BLD: 6.7 M/UL — HIGH (ref 3.8–5.2)
RBC # FLD: 17.1 % — HIGH (ref 10.3–14.5)
SODIUM SERPL-SCNC: 134 MMOL/L — LOW (ref 135–145)
WBC # BLD: 7.5 K/UL — SIGNIFICANT CHANGE UP (ref 3.8–10.5)
WBC # FLD AUTO: 7.5 K/UL — SIGNIFICANT CHANGE UP (ref 3.8–10.5)

## 2020-04-16 PROCEDURE — 99239 HOSP IP/OBS DSCHRG MGMT >30: CPT

## 2020-04-16 RX ADMIN — Medication 16 UNIT(S): at 08:34

## 2020-04-16 RX ADMIN — Medication 16 UNIT(S): at 12:13

## 2020-04-16 RX ADMIN — ENOXAPARIN SODIUM 40 MILLIGRAM(S): 100 INJECTION SUBCUTANEOUS at 05:20

## 2020-04-16 RX ADMIN — Medication 16 UNIT(S): at 17:06

## 2020-04-16 RX ADMIN — Medication 100 MILLIGRAM(S): at 05:20

## 2020-04-16 RX ADMIN — Medication 25 MILLIGRAM(S): at 05:21

## 2020-04-16 RX ADMIN — Medication 81 MILLIGRAM(S): at 11:58

## 2020-04-16 RX ADMIN — Medication 2: at 12:13

## 2020-04-16 RX ADMIN — Medication 1: at 08:34

## 2020-04-16 RX ADMIN — Medication 1: at 17:06

## 2020-04-16 RX ADMIN — Medication 100 MILLIGRAM(S): at 22:35

## 2020-04-16 RX ADMIN — ENOXAPARIN SODIUM 40 MILLIGRAM(S): 100 INJECTION SUBCUTANEOUS at 17:03

## 2020-04-16 RX ADMIN — Medication 100 MILLIGRAM(S): at 17:03

## 2020-04-16 RX ADMIN — SIMVASTATIN 20 MILLIGRAM(S): 20 TABLET, FILM COATED ORAL at 22:35

## 2020-04-16 NOTE — PROGRESS NOTE ADULT - PROBLEM SELECTOR PLAN 1
-acute hypoxic respiratory failure with hypoxia d/t covid infection with bilateral infiltrates on imaging, c/w acute viral pneumonia  -continues to improve, currently on 2L NC, sating 96%  -cont to wean O2 off as tolerated  -discharge planning home once ambulatory O2 sat >90-92% on RA with ambulation  -completed plaquenil and solumedrol x 5 day course   - completed anakinra taper on 4/15  -inflammatory markers overall trending down (crp 7<4, d-dimer<150, ferritin 440)  - -GOC: d/w pt and daughter, full code  -dispo: pt can be discharged once sating well on RA w/ ambulation

## 2020-04-16 NOTE — PROVIDER CONTACT NOTE (OTHER) - REASON
ambulating o2 sat 81%, 15 seconds recovery time once walking stopped
patient blood sugar 62
patients resting HR  was sustaining at 180bpm
What Type Of Note Output Would You Prefer (Optional)?: Standard Output
Have Your Spot(S) Been Treated In The Past?: has not been treated
Hpi Title: Evaluation of a Skin Lesion

## 2020-04-16 NOTE — PROGRESS NOTE ADULT - SUBJECTIVE AND OBJECTIVE BOX
Patient is a 59y old  Female who presents with a chief complaint of hypoxic respiratory failure, COVID-19+ (15 Apr 2020 11:58)      SUBJECTIVE / OVERNIGHT EVENTS: Pt feels "great." Walked in the room to the bathroom without SOB.     MEDICATIONS  (STANDING):  aspirin enteric coated 81 milliGRAM(s) Oral daily  benzonatate 100 milliGRAM(s) Oral three times a day  dextrose 5%. 1000 milliLiter(s) (50 mL/Hr) IV Continuous <Continuous>  dextrose 50% Injectable 12.5 Gram(s) IV Push once  dextrose 50% Injectable 25 Gram(s) IV Push once  dextrose 50% Injectable 25 Gram(s) IV Push once  enoxaparin Injectable 40 milliGRAM(s) SubCutaneous two times a day  insulin glargine Injectable (LANTUS) 50 Unit(s) SubCutaneous at bedtime  insulin lispro (HumaLOG) corrective regimen sliding scale   SubCutaneous at bedtime  insulin lispro (HumaLOG) corrective regimen sliding scale   SubCutaneous three times a day before meals  insulin lispro Injectable (HumaLOG) 16 Unit(s) SubCutaneous three times a day before meals  metoprolol succinate ER 25 milliGRAM(s) Oral daily  polyethylene glycol 3350 17 Gram(s) Oral daily  simvastatin 20 milliGRAM(s) Oral at bedtime    MEDICATIONS  (PRN):  acetaminophen   Tablet .. 650 milliGRAM(s) Oral every 6 hours PRN Temp greater or equal to 38C (100.4F)  ALBUTerol    90 MICROgram(s) HFA Inhaler 2 Puff(s) Inhalation every 6 hours PRN Shortness of Breath and/or Wheezing  dextrose 40% Gel 15 Gram(s) Oral once PRN Blood Glucose LESS THAN 70 milliGRAM(s)/deciliter  glucagon  Injectable 1 milliGRAM(s) IntraMuscular once PRN Glucose LESS THAN 70 milligrams/deciliter  guaiFENesin   Syrup  (Sugar-Free) 200 milliGRAM(s) Oral every 6 hours PRN Cough      CAPILLARY BLOOD GLUCOSE      POCT Blood Glucose.: 199 mg/dL (16 Apr 2020 07:58)  POCT Blood Glucose.: 121 mg/dL (15 Apr 2020 22:21)  POCT Blood Glucose.: 118 mg/dL (15 Apr 2020 18:00)  POCT Blood Glucose.: 218 mg/dL (15 Apr 2020 13:08)    I&O's Summary      PHYSICAL EXAM:  Vital Signs Last 24 Hrs  T(C): 36.6 (16 Apr 2020 05:19), Max: 36.9 (15 Apr 2020 21:20)  T(F): 97.8 (16 Apr 2020 05:19), Max: 98.4 (15 Apr 2020 21:20)  HR: 71 (16 Apr 2020 10:33) (66 - 75)  BP: 119/57 (16 Apr 2020 05:19) (107/67 - 119/57)  BP(mean): --  RR: 17 (16 Apr 2020 10:33) (17 - 18)  SpO2: 91% (16 Apr 2020 10:33) (89% - 91%)  CONSTITUTIONAL: NAD, well-developed, well-groomed  ENMT: Moist oral mucosa, no pharyngeal injection or exudates; normal dentition  RESPIRATORY: Normal respiratory effort; lungs are clear to auscultation bilaterally  CARDIOVASCULAR: Regular rate and rhythm, normal S1 and S2, no murmur/rub/gallop; No lower extremity edema; Peripheral pulses are 2+ bilaterally  ABDOMEN: Nontender to palpation, normoactive bowel sounds, no rebound/guarding; No hepatosplenomegaly    LABS:                        12.3   7.50  )-----------( 494      ( 16 Apr 2020 05:26 )             40.2     04-16    134<L>  |  99  |  17  ----------------------------<  161<H>  4.6   |  22  |  0.84    Ca    9.3      16 Apr 2020 05:26    TPro  7.2  /  Alb  3.0<L>  /  TBili  0.5  /  DBili  x   /  AST  16  /  ALT  16  /  AlkPhos  95  04-16                RADIOLOGY & ADDITIONAL TESTS:  Results Reviewed:   Imaging Personally Reviewed:  Electrocardiogram Personally Reviewed:    COORDINATION OF CARE:  Care Discussed with Consultants/Other Providers [Y/N]:  Prior or Outpatient Records Reviewed [Y/N]:

## 2020-04-16 NOTE — PROVIDER CONTACT NOTE (OTHER) - SITUATION
ambulating o2 sat 81% room air, 15 seconds recovery time once walking stopped. acp team maciel called and made aware

## 2020-04-17 ENCOUNTER — TRANSCRIPTION ENCOUNTER (OUTPATIENT)
Age: 60
End: 2020-04-17

## 2020-04-17 VITALS — OXYGEN SATURATION: 90 % | RESPIRATION RATE: 16 BRPM

## 2020-04-17 LAB
ALBUMIN SERPL ELPH-MCNC: 3 G/DL — LOW (ref 3.3–5)
ALP SERPL-CCNC: 93 U/L — SIGNIFICANT CHANGE UP (ref 40–120)
ALT FLD-CCNC: 22 U/L — SIGNIFICANT CHANGE UP (ref 4–33)
ANION GAP SERPL CALC-SCNC: 13 MMO/L — SIGNIFICANT CHANGE UP (ref 7–14)
AST SERPL-CCNC: 19 U/L — SIGNIFICANT CHANGE UP (ref 4–32)
BASOPHILS # BLD AUTO: 0.07 K/UL — SIGNIFICANT CHANGE UP (ref 0–0.2)
BASOPHILS NFR BLD AUTO: 0.8 % — SIGNIFICANT CHANGE UP (ref 0–2)
BILIRUB SERPL-MCNC: 0.4 MG/DL — SIGNIFICANT CHANGE UP (ref 0.2–1.2)
BUN SERPL-MCNC: 15 MG/DL — SIGNIFICANT CHANGE UP (ref 7–23)
CALCIUM SERPL-MCNC: 9.5 MG/DL — SIGNIFICANT CHANGE UP (ref 8.4–10.5)
CHLORIDE SERPL-SCNC: 102 MMOL/L — SIGNIFICANT CHANGE UP (ref 98–107)
CO2 SERPL-SCNC: 20 MMOL/L — LOW (ref 22–31)
CREAT SERPL-MCNC: 0.83 MG/DL — SIGNIFICANT CHANGE UP (ref 0.5–1.3)
EOSINOPHIL # BLD AUTO: 0.16 K/UL — SIGNIFICANT CHANGE UP (ref 0–0.5)
EOSINOPHIL NFR BLD AUTO: 1.8 % — SIGNIFICANT CHANGE UP (ref 0–6)
GLUCOSE BLDC GLUCOMTR-MCNC: 135 MG/DL — HIGH (ref 70–99)
GLUCOSE BLDC GLUCOMTR-MCNC: 208 MG/DL — HIGH (ref 70–99)
GLUCOSE BLDC GLUCOMTR-MCNC: 228 MG/DL — HIGH (ref 70–99)
GLUCOSE SERPL-MCNC: 185 MG/DL — HIGH (ref 70–99)
HCT VFR BLD CALC: 39 % — SIGNIFICANT CHANGE UP (ref 34.5–45)
HGB BLD-MCNC: 12 G/DL — SIGNIFICANT CHANGE UP (ref 11.5–15.5)
IMM GRANULOCYTES NFR BLD AUTO: 4.2 % — HIGH (ref 0–1.5)
LYMPHOCYTES # BLD AUTO: 1.58 K/UL — SIGNIFICANT CHANGE UP (ref 1–3.3)
LYMPHOCYTES # BLD AUTO: 18 % — SIGNIFICANT CHANGE UP (ref 13–44)
MAGNESIUM SERPL-MCNC: 2.1 MG/DL — SIGNIFICANT CHANGE UP (ref 1.6–2.6)
MCHC RBC-ENTMCNC: 18.7 PG — LOW (ref 27–34)
MCHC RBC-ENTMCNC: 30.8 % — LOW (ref 32–36)
MCV RBC AUTO: 60.8 FL — LOW (ref 80–100)
MONOCYTES # BLD AUTO: 0.72 K/UL — SIGNIFICANT CHANGE UP (ref 0–0.9)
MONOCYTES NFR BLD AUTO: 8.2 % — SIGNIFICANT CHANGE UP (ref 2–14)
NEUTROPHILS # BLD AUTO: 5.89 K/UL — SIGNIFICANT CHANGE UP (ref 1.8–7.4)
NEUTROPHILS NFR BLD AUTO: 67 % — SIGNIFICANT CHANGE UP (ref 43–77)
NRBC # FLD: 0.02 K/UL — SIGNIFICANT CHANGE UP (ref 0–0)
PHOSPHATE SERPL-MCNC: 3.3 MG/DL — SIGNIFICANT CHANGE UP (ref 2.5–4.5)
PLATELET # BLD AUTO: 415 K/UL — HIGH (ref 150–400)
PMV BLD: 9.7 FL — SIGNIFICANT CHANGE UP (ref 7–13)
POTASSIUM SERPL-MCNC: 4.7 MMOL/L — SIGNIFICANT CHANGE UP (ref 3.5–5.3)
POTASSIUM SERPL-SCNC: 4.7 MMOL/L — SIGNIFICANT CHANGE UP (ref 3.5–5.3)
PROT SERPL-MCNC: 7.1 G/DL — SIGNIFICANT CHANGE UP (ref 6–8.3)
RBC # BLD: 6.41 M/UL — HIGH (ref 3.8–5.2)
RBC # FLD: 17.2 % — HIGH (ref 10.3–14.5)
SODIUM SERPL-SCNC: 135 MMOL/L — SIGNIFICANT CHANGE UP (ref 135–145)
WBC # BLD: 8.79 K/UL — SIGNIFICANT CHANGE UP (ref 3.8–10.5)
WBC # FLD AUTO: 8.79 K/UL — SIGNIFICANT CHANGE UP (ref 3.8–10.5)

## 2020-04-17 PROCEDURE — 99233 SBSQ HOSP IP/OBS HIGH 50: CPT

## 2020-04-17 RX ORDER — ISOPROPYL ALCOHOL, BENZOCAINE .7; .06 ML/ML; ML/ML
1 SWAB TOPICAL
Qty: 100 | Refills: 1
Start: 2020-04-17 | End: 2020-06-05

## 2020-04-17 RX ORDER — INSULIN DETEMIR 100/ML (3)
50 INSULIN PEN (ML) SUBCUTANEOUS
Qty: 15 | Refills: 0
Start: 2020-04-17 | End: 2020-05-16

## 2020-04-17 RX ORDER — INSULIN LISPRO 100/ML
16 VIAL (ML) SUBCUTANEOUS
Qty: 15 | Refills: 0
Start: 2020-04-17 | End: 2020-05-16

## 2020-04-17 RX ORDER — POLYETHYLENE GLYCOL 3350 17 G/17G
17 POWDER, FOR SOLUTION ORAL
Qty: 0 | Refills: 0 | DISCHARGE
Start: 2020-04-17

## 2020-04-17 RX ORDER — LOSARTAN POTASSIUM 100 MG/1
1 TABLET, FILM COATED ORAL
Qty: 0 | Refills: 0 | DISCHARGE

## 2020-04-17 RX ORDER — ENOXAPARIN SODIUM 100 MG/ML
50 INJECTION SUBCUTANEOUS
Qty: 15 | Refills: 0
Start: 2020-04-17 | End: 2020-05-16

## 2020-04-17 RX ORDER — INSULIN DETEMIR 100/ML (3)
40 INSULIN PEN (ML) SUBCUTANEOUS
Qty: 0 | Refills: 0 | DISCHARGE

## 2020-04-17 RX ADMIN — Medication 100 MILLIGRAM(S): at 04:37

## 2020-04-17 RX ADMIN — Medication 100 MILLIGRAM(S): at 14:23

## 2020-04-17 RX ADMIN — Medication 25 MILLIGRAM(S): at 05:36

## 2020-04-17 RX ADMIN — Medication 16 UNIT(S): at 12:27

## 2020-04-17 RX ADMIN — ENOXAPARIN SODIUM 40 MILLIGRAM(S): 100 INJECTION SUBCUTANEOUS at 04:37

## 2020-04-17 RX ADMIN — Medication 2: at 09:15

## 2020-04-17 RX ADMIN — Medication 16 UNIT(S): at 09:15

## 2020-04-17 RX ADMIN — INSULIN GLARGINE 50 UNIT(S): 100 INJECTION, SOLUTION SUBCUTANEOUS at 01:12

## 2020-04-17 RX ADMIN — Medication 81 MILLIGRAM(S): at 12:27

## 2020-04-17 NOTE — PROGRESS NOTE ADULT - REASON FOR ADMISSION
hypoxic respiratory failure, COVID-19+

## 2020-04-17 NOTE — PROGRESS NOTE ADULT - PROBLEM SELECTOR PLAN 5
hypercoagulable state d/t covid,   c/w lovenox 40 mg bid (BMI >30)

## 2020-04-17 NOTE — DISCHARGE NOTE PROVIDER - NSDCMRMEDTOKEN_GEN_ALL_CORE_FT
alcohol swabs : Apply topically to affected area 4 times a day   Aspir 81 oral delayed release tablet: 1 tab(s) orally once a day  glucometer (per patient&#x27;s insurance): Test blood sugars four times a day. Dispense #1 glucometer.  HumaLOG KwikPen 100 units/mL injectable solution: 16 unit(s) subcutaneous 3 times a day (with meals)   Insulin Pen Needles, 4mm: 1 application subcutaneously 4 times a day. ** Use with insulin pen **   lancets: 1 application subcutaneously 4 times a day   Lantus Solostar Pen 100 units/mL subcutaneous solution: 50 unit(s) subcutaneous once a day (at bedtime)   Metoprolol Succinate ER 25 mg oral tablet, extended release: 1 tab(s) orally once a day  polyethylene glycol 3350 oral powder for reconstitution: 17 gram(s) orally once a day  test strips (per patient&#x27;s insurance): 1 application subcutaneously 4 times a day. ** Compatible with patient&#x27;s glucometer **  Zocor 20 mg oral tablet: 1 tab(s) orally once a day (at bedtime) alcohol swabs : Apply topically to affected area 4 times a day   Aspir 81 oral delayed release tablet: 1 tab(s) orally once a day  glucometer (per patient&#x27;s insurance): Test blood sugars four times a day. Dispense #1 glucometer.  Insulin Pen Needles, 4mm: 1 application subcutaneously 4 times a day. ** Use with insulin pen **   lancets: 1 application subcutaneously 4 times a day   Levemir FlexPen 100 units/mL subcutaneous solution: 50 unit(s) subcutaneous once a day (at bedtime)   Metoprolol Succinate ER 25 mg oral tablet, extended release: 1 tab(s) orally once a day  polyethylene glycol 3350 oral powder for reconstitution: 17 gram(s) orally once a day  test strips (per patient&#x27;s insurance): 1 application subcutaneously 4 times a day. ** Compatible with patient&#x27;s glucometer **  Zocor 20 mg oral tablet: 1 tab(s) orally once a day (at bedtime)

## 2020-04-17 NOTE — DISCHARGE NOTE NURSING/CASE MANAGEMENT/SOCIAL WORK - PATIENT PORTAL LINK FT
You can access the FollowMyHealth Patient Portal offered by [facility] by registering at the following website: [email or web address]. By joining [employer]’s FollowMyHealth portal, you will also be able to view your health information using other applications (apps) compatible with our system.

## 2020-04-17 NOTE — DISCHARGE NOTE PROVIDER - HOSPITAL COURSE
60 YO M PMHx of HTN, DM2 A1C 11.8 and CAD s/p CABG admitted for hypoxic respiratory failure due to COVID 19. CXR with clear lungs.         Hospital course:    Pt admitted for COVID. Diagnosed on 4/2. Pt received Plaquenil, Solumedrol, and Anakrina. Losartan was held for infection and BP has been stable off Losartan. Pt had high sugars while in the hospital. HgA1C 11.8. Pts Levemir was increased to 50 units and Humalog 16 units was added.         Case discussed with Dr. Hutton on 4/17, pt medically stable for discharge home. 58 YO M PMHx of HTN, DM2 A1C 11.8 and CAD s/p CABG admitted for hypoxic respiratory failure due to COVID 19. CXR with clear lungs.         Hospital course:    Pt admitted for COVID. Diagnosed on 4/2. Pt received Plaquenil, Solumedrol, and Anakrina. Pt satting 90% on room air on ambulation and 95% with rest. Losartan was held for infection and BP has been stable off Losartan. Pt had high sugars while in the hospital. HgA1C 11.8. Pts Levemir was increased to 50 units.         Humalog unable to be added because patient has emergency medicaid pending so Humalog/Novolog/Admalog not covered. Pt will be DC'ed on Levemir 50 units and follow up with her PCP. Offered glucometer but pt said she does not have money right now and will get it as outpatient when she follows up with her PCP. Pt has enough Levemir at home.        Case discussed with Dr. Hutton on 4/17, pt medically stable for discharge home. 60 YO M PMHx of HTN, DM2 A1C 11.8 and CAD s/p CABG admitted for hypoxic respiratory failure due to COVID 19. CXR with clear lungs.         Hospital course:    Pt admitted for COVID. Diagnosed on 4/2. Pt received Plaquenil, Solumedrol, and Anakrina. Pt satting 90% on room air on ambulation and 95% with rest. Losartan was held for infection and BP has been stable off Losartan. Pt had high sugars while in the hospital. HgA1C 11.8. Pts Levemir was increased to 50 units.         Humalog unable to be added because patient has emergency medicaid pending so Humalog/Novolog/Admalog not covered. Pt will be DC'ed on Levemir 50 units and follow up with her PCP. Offered glucometer but pt said she does not have money right now and will get it as outpatient when she follows up with her PCP. Pt has enough Levemir at home. Discussed with Dr. Hutton.        Case discussed with Dr. Hutton on 4/17, pt medically stable for discharge home.

## 2020-04-17 NOTE — DISCHARGE NOTE PROVIDER - NSDCCPCAREPLAN_GEN_ALL_CORE_FT
PRINCIPAL DISCHARGE DIAGNOSIS  Diagnosis: COVID-19 virus infection  Assessment and Plan of Treatment: You have been diagnosed with the COVID-19 virus during your hospital stay. You must self quarantine to complete a 14 day time period.  Monitor for fevers, shortness of breath and cough primarily.  Monitor your temperature daily to not any changes and increases.    It has been determined that you no longer need hospitalization and can recover while remaining in self-quarantine at home. You should follow the prevention steps below until a healthcare provider or local or state health department says you can return to your normal activities.  1. You should restrict activities outside your home, except for getting medical care.  2. Do not go to work, school, or public areas.  3. Avoid using public transportation, ride-sharing, or taxis.  4. Separate yourself from other people and animals in your home.  5. Call ahead before visiting your doctor.  6. Wear a facemask.  7. Cover your coughs and sneezes.  8. Clean your hands often.  9. Avoid sharing personal household items.  10. Clean all “high-touch” surfaces everyday.  11. Monitor your symptoms.  If you have a medical emergency and need to call 911, notify the dispatch personnel that you have COVID-19 If possible, put on a facemask before emergency medical services arrive.  12. Stopping home isolation.  Patients with confirmed COVID-19 should remain under home isolation precautions for 14 days since the positive COVID-19 test and until the risk of secondary transmission to others is thought to be low. The decision to discontinue home isolation precautions should be made on a case-by-case basis, in consultation with healthcare providers and state and local health departments. Your [facility] Department of Health can be reached at [phone] for further information about COVID-19.      SECONDARY DISCHARGE DIAGNOSES  Diagnosis: Type 2 diabetes mellitus with hyperglycemia, without long-term current use of insulin  Assessment and Plan of Treatment: Your Hemoglobin A1C is 11.8. Target goal for hemoglobin A1C is <6.5. Your insulin was adjusted---  Monitor blood glucose levels throughout the day before meals and at bedtime. Record blood sugars and bring to outpatient providers appointment in order to be reviewed by your doctor for management modifications. If your sugars are more than 400 or less than 70 you should contact your PCP immediately. Monitor for signs/symptoms of low blood glucose, such as, dizziness, altered mental status, or cool/clammy skin. In addition, monitor for signs/symptoms of high blood glucose, such as, feeling hot, dry, fatigued, or with increased thirst/urination. Make regular podiatry appointments in order to have feet checked for wounds and uncontrolled toe nail growth to prevent infections, as well as, appointments with an ophthalmologist to monitor your vision.    Diagnosis: Essential hypertension  Assessment and Plan of Treatment: Your Losartan was held since your blood pressures have been stable. Please monitor your blood pressure at home and follow up with your PCP when to take this again.    Diagnosis: Coronary artery disease involving native heart without angina pectoris, unspecified vessel or lesion type  Assessment and Plan of Treatment: Please continue to take Simvastatin 20mg, Metoprolol ER 25mg daily, and Aspirin 81mg daily. Please maintain a low cholesterol, low salt diet. Please follow up with your PCP in 1-2 weeks. PRINCIPAL DISCHARGE DIAGNOSIS  Diagnosis: COVID-19 virus infection  Assessment and Plan of Treatment: You have been diagnosed with the COVID-19 virus during your hospital stay. You must self quarantine to complete a 14 day time period.  Monitor for fevers, shortness of breath and cough primarily.  Monitor your temperature daily to not any changes and increases.    It has been determined that you no longer need hospitalization and can recover while remaining in self-quarantine at home. You should follow the prevention steps below until a healthcare provider or local or state health department says you can return to your normal activities.  1. You should restrict activities outside your home, except for getting medical care.  2. Do not go to work, school, or public areas.  3. Avoid using public transportation, ride-sharing, or taxis.  4. Separate yourself from other people and animals in your home.  5. Call ahead before visiting your doctor.  6. Wear a facemask.  7. Cover your coughs and sneezes.  8. Clean your hands often.  9. Avoid sharing personal household items.  10. Clean all “high-touch” surfaces everyday.  11. Monitor your symptoms.  If you have a medical emergency and need to call 911, notify the dispatch personnel that you have COVID-19 If possible, put on a facemask before emergency medical services arrive.  12. Stopping home isolation.  Patients with confirmed COVID-19 should remain under home isolation precautions for 14 days since the positive COVID-19 test and until the risk of secondary transmission to others is thought to be low. The decision to discontinue home isolation precautions should be made on a case-by-case basis, in consultation with healthcare providers and state and local health departments. Your [facility] Department of Health can be reached at [phone] for further information about COVID-19.      SECONDARY DISCHARGE DIAGNOSES  Diagnosis: Type 2 diabetes mellitus with hyperglycemia, without long-term current use of insulin  Assessment and Plan of Treatment: Your Hemoglobin A1C is 11.8. Target goal for hemoglobin A1C is <6.5. Your insulin was adjusted. Please start to take Levemir 50 units at bedtime. You may need premeal insulin in the future (Humalog) however your insurance will not cover that right now.  Monitor blood glucose levels throughout the day before meals and at bedtime. Record blood sugars and bring to outpatient providers appointment in order to be reviewed by your doctor for management modifications. If your sugars are more than 400 or less than 70 you should contact your PCP immediately. Monitor for signs/symptoms of low blood glucose, such as, dizziness, altered mental status, or cool/clammy skin. In addition, monitor for signs/symptoms of high blood glucose, such as, feeling hot, dry, fatigued, or with increased thirst/urination. Make regular podiatry appointments in order to have feet checked for wounds and uncontrolled toe nail growth to prevent infections, as well as, appointments with an ophthalmologist to monitor your vision.  Please make sure to follow up with your PCP in 1-2 weeks.    Diagnosis: Essential hypertension  Assessment and Plan of Treatment: Your Losartan was held since your blood pressures have been stable. Please monitor your blood pressure at home and follow up with your PCP when to take this again.    Diagnosis: Coronary artery disease involving native heart without angina pectoris, unspecified vessel or lesion type  Assessment and Plan of Treatment: Please continue to take Simvastatin 20mg, Metoprolol ER 25mg daily, and Aspirin 81mg daily. Please maintain a low cholesterol, low salt diet. Please follow up with your PCP in 1-2 weeks.

## 2020-04-17 NOTE — PROGRESS NOTE ADULT - ATTENDING COMMENTS
Principal diagnosis B34.2  14114
Principal diagnosis B34.2  36215
Principal diagnosis B34.2  48176
Principal diagnosis B34.2  44401
Principal diagnosis B34.2  01805
Principal diagnosis B34.2  19586
Principal diagnosis B34.2  24100
Principal diagnosis B34.2  27786
Principal diagnosis B34.2  35356
Principal diagnosis B34.2  46961
Principal diagnosis B34.2  74648  Time planning discharge 35 minutes.
Principal diagnosis B34.2  86231

## 2020-04-17 NOTE — DISCHARGE NOTE PROVIDER - CARE PROVIDER_API CALL
Ania Sanchez)  Internal Medicine  [address] [place] [address]  Phone: [phone]  Fax: [phone]  Follow Up Time:

## 2020-04-17 NOTE — PROGRESS NOTE ADULT - SUBJECTIVE AND OBJECTIVE BOX
Patient is a 59y old  Female who presents with a chief complaint of hypoxic respiratory failure, COVID-19+ (16 Apr 2020 11:48)      SUBJECTIVE / OVERNIGHT EVENTS: Pt slept well and feel "great" this morning.     MEDICATIONS  (STANDING):  aspirin enteric coated 81 milliGRAM(s) Oral daily  benzonatate 100 milliGRAM(s) Oral three times a day  dextrose 5%. 1000 milliLiter(s) (50 mL/Hr) IV Continuous <Continuous>  dextrose 50% Injectable 12.5 Gram(s) IV Push once  dextrose 50% Injectable 25 Gram(s) IV Push once  dextrose 50% Injectable 25 Gram(s) IV Push once  enoxaparin Injectable 40 milliGRAM(s) SubCutaneous two times a day  insulin glargine Injectable (LANTUS) 50 Unit(s) SubCutaneous at bedtime  insulin lispro (HumaLOG) corrective regimen sliding scale   SubCutaneous at bedtime  insulin lispro (HumaLOG) corrective regimen sliding scale   SubCutaneous three times a day before meals  insulin lispro Injectable (HumaLOG) 16 Unit(s) SubCutaneous three times a day before meals  metoprolol succinate ER 25 milliGRAM(s) Oral daily  polyethylene glycol 3350 17 Gram(s) Oral daily  simvastatin 20 milliGRAM(s) Oral at bedtime    MEDICATIONS  (PRN):  acetaminophen   Tablet .. 650 milliGRAM(s) Oral every 6 hours PRN Temp greater or equal to 38C (100.4F)  ALBUTerol    90 MICROgram(s) HFA Inhaler 2 Puff(s) Inhalation every 6 hours PRN Shortness of Breath and/or Wheezing  dextrose 40% Gel 15 Gram(s) Oral once PRN Blood Glucose LESS THAN 70 milliGRAM(s)/deciliter  glucagon  Injectable 1 milliGRAM(s) IntraMuscular once PRN Glucose LESS THAN 70 milligrams/deciliter  guaiFENesin   Syrup  (Sugar-Free) 200 milliGRAM(s) Oral every 6 hours PRN Cough      CAPILLARY BLOOD GLUCOSE      POCT Blood Glucose.: 135 mg/dL (17 Apr 2020 12:05)  POCT Blood Glucose.: 228 mg/dL (17 Apr 2020 08:40)  POCT Blood Glucose.: 208 mg/dL (17 Apr 2020 01:07)  POCT Blood Glucose.: 133 mg/dL (16 Apr 2020 22:26)  POCT Blood Glucose.: 157 mg/dL (16 Apr 2020 16:54)    I&O's Summary      PHYSICAL EXAM:  Vital Signs Last 24 Hrs  T(C): 36.4 (17 Apr 2020 11:24), Max: 36.8 (17 Apr 2020 04:37)  T(F): 97.5 (17 Apr 2020 11:24), Max: 98.2 (17 Apr 2020 04:37)  HR: 83 (17 Apr 2020 11:24) (62 - 83)  BP: 131/71 (17 Apr 2020 11:24) (109/51 - 131/71)  BP(mean): --  RR: 18 (17 Apr 2020 11:24) (18 - 20)  SpO2: 90% (17 Apr 2020 11:24) (90% - 95%)  CONSTITUTIONAL: NAD, well-developed, well-groomed  ENMT: Moist oral mucosa, no pharyngeal injection or exudates; normal dentition  RESPIRATORY: Normal respiratory effort; lungs are clear to auscultation bilaterally  CARDIOVASCULAR: Regular rate and rhythm, normal S1 and S2, no murmur/rub/gallop; No lower extremity edema; Peripheral pulses are 2+ bilaterally  ABDOMEN: Nontender to palpation, normoactive bowel sounds, no rebound/guarding; No hepatosplenomegaly      LABS:                        12.0   8.79  )-----------( 415      ( 17 Apr 2020 05:35 )             39.0     04-17    135  |  102  |  15  ----------------------------<  185<H>  4.7   |  20<L>  |  0.83    Ca    9.5      17 Apr 2020 05:35  Phos  3.3     04-17  Mg     2.1     04-17    TPro  7.1  /  Alb  3.0<L>  /  TBili  0.4  /  DBili  x   /  AST  19  /  ALT  22  /  AlkPhos  93  04-17                RADIOLOGY & ADDITIONAL TESTS:  Results Reviewed:   Imaging Personally Reviewed:  Electrocardiogram Personally Reviewed:    COORDINATION OF CARE:  Care Discussed with Consultants/Other Providers [Y/N]:  Prior or Outpatient Records Reviewed [Y/N]:

## 2020-07-09 ENCOUNTER — TRANSCRIPTION ENCOUNTER (OUTPATIENT)
Age: 60
End: 2020-07-09

## 2020-08-16 NOTE — PROGRESS NOTE ADULT - PROBLEM SELECTOR PLAN 2
c/w lantus 50 U hs and humalog 16 U ac  trend FS, glycemic control acceptable  A1c 11.8 no vomiting/no abdominal pain/no diarrhea

## 2020-12-31 PROBLEM — R59.9 ADENOPATHY: Status: ACTIVE | Noted: 2019-08-07

## 2022-06-17 NOTE — PROGRESS NOTE ADULT - PROVIDER SPECIALTY LIST ADULT
Chelly from [facility] nurse called because was being treated for a wound in left lower leg. They were even thinking of discharging him because his wound was healing nicely. Yesterday they received a call from the facility where he is that his wound had opened up again. They immediately went out to check patient and yes the would was open again and warm to touch. Nurse Chelly stated it does look like there was trauma to the wound. Facility waited a week an half to contact them, no mentioned of anything that might of cause how the trauma happened. She called to notify pcp that patient would benefit from antibiotics. Chelly stated if need to talk to her she can be reached at [phone]. Please advise. Rx should be sent to facility nurse.   Hospitalist

## 2023-05-19 ENCOUNTER — INPATIENT (INPATIENT)
Facility: HOSPITAL | Age: 63
LOS: 4 days | Discharge: HOME | End: 2023-05-24
Attending: INTERNAL MEDICINE | Admitting: INTERNAL MEDICINE
Payer: MEDICAID

## 2023-05-19 VITALS
RESPIRATION RATE: 16 BRPM | TEMPERATURE: 98 F | DIASTOLIC BLOOD PRESSURE: 82 MMHG | OXYGEN SATURATION: 100 % | HEART RATE: 56 BPM | SYSTOLIC BLOOD PRESSURE: 143 MMHG

## 2023-05-19 DIAGNOSIS — I25.10 ATHEROSCLEROTIC HEART DISEASE OF NATIVE CORONARY ARTERY WITHOUT ANGINA PECTORIS: ICD-10-CM

## 2023-05-19 DIAGNOSIS — R07.9 CHEST PAIN, UNSPECIFIED: ICD-10-CM

## 2023-05-19 DIAGNOSIS — I10 ESSENTIAL (PRIMARY) HYPERTENSION: ICD-10-CM

## 2023-05-19 DIAGNOSIS — E11.65 TYPE 2 DIABETES MELLITUS WITH HYPERGLYCEMIA: ICD-10-CM

## 2023-05-19 DIAGNOSIS — Z29.9 ENCOUNTER FOR PROPHYLACTIC MEASURES, UNSPECIFIED: ICD-10-CM

## 2023-05-19 DIAGNOSIS — Z95.1 PRESENCE OF AORTOCORONARY BYPASS GRAFT: Chronic | ICD-10-CM

## 2023-05-19 DIAGNOSIS — E78.5 HYPERLIPIDEMIA, UNSPECIFIED: ICD-10-CM

## 2023-05-19 LAB
ALBUMIN SERPL ELPH-MCNC: 4.4 G/DL — SIGNIFICANT CHANGE UP (ref 3.3–5)
ALP SERPL-CCNC: 92 U/L — SIGNIFICANT CHANGE UP (ref 40–120)
ALT FLD-CCNC: 18 U/L — SIGNIFICANT CHANGE UP (ref 4–33)
ANION GAP SERPL CALC-SCNC: 14 MMOL/L — SIGNIFICANT CHANGE UP (ref 7–14)
ANISOCYTOSIS BLD QL: SLIGHT — SIGNIFICANT CHANGE UP
AST SERPL-CCNC: 29 U/L — SIGNIFICANT CHANGE UP (ref 4–32)
BASOPHILS # BLD AUTO: 0 K/UL — SIGNIFICANT CHANGE UP (ref 0–0.2)
BASOPHILS NFR BLD AUTO: 0 % — SIGNIFICANT CHANGE UP (ref 0–2)
BILIRUB SERPL-MCNC: 0.4 MG/DL — SIGNIFICANT CHANGE UP (ref 0.2–1.2)
BUN SERPL-MCNC: 15 MG/DL — SIGNIFICANT CHANGE UP (ref 7–23)
CALCIUM SERPL-MCNC: 9.7 MG/DL — SIGNIFICANT CHANGE UP (ref 8.4–10.5)
CHLORIDE SERPL-SCNC: 102 MMOL/L — SIGNIFICANT CHANGE UP (ref 98–107)
CK MB BLD-MCNC: 4.8 % — HIGH (ref 0–2.5)
CK MB CFR SERPL CALC: 8 NG/ML — HIGH
CK SERPL-CCNC: 168 U/L — SIGNIFICANT CHANGE UP (ref 25–170)
CO2 SERPL-SCNC: 22 MMOL/L — SIGNIFICANT CHANGE UP (ref 22–31)
CREAT SERPL-MCNC: 0.99 MG/DL — SIGNIFICANT CHANGE UP (ref 0.5–1.3)
D DIMER BLD IA.RAPID-MCNC: <150 NG/ML DDU — SIGNIFICANT CHANGE UP
DACRYOCYTES BLD QL SMEAR: SLIGHT — SIGNIFICANT CHANGE UP
EGFR: 64 ML/MIN/1.73M2 — SIGNIFICANT CHANGE UP
ELLIPTOCYTES BLD QL SMEAR: SLIGHT — SIGNIFICANT CHANGE UP
EOSINOPHIL # BLD AUTO: 0.22 K/UL — SIGNIFICANT CHANGE UP (ref 0–0.5)
EOSINOPHIL NFR BLD AUTO: 2.6 % — SIGNIFICANT CHANGE UP (ref 0–6)
GLUCOSE BLDC GLUCOMTR-MCNC: 110 MG/DL — HIGH (ref 70–99)
GLUCOSE BLDC GLUCOMTR-MCNC: 131 MG/DL — HIGH (ref 70–99)
GLUCOSE SERPL-MCNC: 80 MG/DL — SIGNIFICANT CHANGE UP (ref 70–99)
HCT VFR BLD CALC: 39.4 % — SIGNIFICANT CHANGE UP (ref 34.5–45)
HGB BLD-MCNC: 11.8 G/DL — SIGNIFICANT CHANGE UP (ref 11.5–15.5)
HYPOCHROMIA BLD QL: SLIGHT — SIGNIFICANT CHANGE UP
IANC: 4.65 K/UL — SIGNIFICANT CHANGE UP (ref 1.8–7.4)
LYMPHOCYTES # BLD AUTO: 2.96 K/UL — SIGNIFICANT CHANGE UP (ref 1–3.3)
LYMPHOCYTES # BLD AUTO: 35.3 % — SIGNIFICANT CHANGE UP (ref 13–44)
MCHC RBC-ENTMCNC: 18.5 PG — LOW (ref 27–34)
MCHC RBC-ENTMCNC: 29.9 GM/DL — LOW (ref 32–36)
MCV RBC AUTO: 61.7 FL — LOW (ref 80–100)
MICROCYTES BLD QL: SIGNIFICANT CHANGE UP
MONOCYTES # BLD AUTO: 0.14 K/UL — SIGNIFICANT CHANGE UP (ref 0–0.9)
MONOCYTES NFR BLD AUTO: 1.7 % — LOW (ref 2–14)
NEUTROPHILS # BLD AUTO: 4.85 K/UL — SIGNIFICANT CHANGE UP (ref 1.8–7.4)
NEUTROPHILS NFR BLD AUTO: 57.8 % — SIGNIFICANT CHANGE UP (ref 43–77)
NT-PROBNP SERPL-SCNC: 69 PG/ML — SIGNIFICANT CHANGE UP
OVALOCYTES BLD QL SMEAR: SIGNIFICANT CHANGE UP
PLAT MORPH BLD: NORMAL — SIGNIFICANT CHANGE UP
PLATELET # BLD AUTO: 312 K/UL — SIGNIFICANT CHANGE UP (ref 150–400)
PLATELET COUNT - ESTIMATE: NORMAL — SIGNIFICANT CHANGE UP
POIKILOCYTOSIS BLD QL AUTO: SLIGHT — SIGNIFICANT CHANGE UP
POLYCHROMASIA BLD QL SMEAR: SLIGHT — SIGNIFICANT CHANGE UP
POTASSIUM SERPL-MCNC: 4.4 MMOL/L — SIGNIFICANT CHANGE UP (ref 3.5–5.3)
POTASSIUM SERPL-SCNC: 4.4 MMOL/L — SIGNIFICANT CHANGE UP (ref 3.5–5.3)
PROT SERPL-MCNC: 7.6 G/DL — SIGNIFICANT CHANGE UP (ref 6–8.3)
RBC # BLD: 6.39 M/UL — HIGH (ref 3.8–5.2)
RBC # FLD: 18.1 % — HIGH (ref 10.3–14.5)
RBC BLD AUTO: ABNORMAL
SODIUM SERPL-SCNC: 138 MMOL/L — SIGNIFICANT CHANGE UP (ref 135–145)
TROPONIN T, HIGH SENSITIVITY RESULT: 14 NG/L — SIGNIFICANT CHANGE UP
TROPONIN T, HIGH SENSITIVITY RESULT: 14 NG/L — SIGNIFICANT CHANGE UP
VARIANT LYMPHS # BLD: 2.6 % — SIGNIFICANT CHANGE UP (ref 0–6)
WBC # BLD: 8.39 K/UL — SIGNIFICANT CHANGE UP (ref 3.8–10.5)
WBC # FLD AUTO: 8.39 K/UL — SIGNIFICANT CHANGE UP (ref 3.8–10.5)

## 2023-05-19 PROCEDURE — 99223 1ST HOSP IP/OBS HIGH 75: CPT

## 2023-05-19 PROCEDURE — 71046 X-RAY EXAM CHEST 2 VIEWS: CPT | Mod: 26

## 2023-05-19 PROCEDURE — 99285 EMERGENCY DEPT VISIT HI MDM: CPT

## 2023-05-19 PROCEDURE — 93010 ELECTROCARDIOGRAM REPORT: CPT

## 2023-05-19 RX ORDER — METOPROLOL TARTRATE 50 MG
25 TABLET ORAL DAILY
Refills: 0 | Status: DISCONTINUED | OUTPATIENT
Start: 2023-05-19 | End: 2023-05-24

## 2023-05-19 RX ORDER — METFORMIN HYDROCHLORIDE 850 MG/1
0 TABLET ORAL
Qty: 0 | Refills: 1 | DISCHARGE

## 2023-05-19 RX ORDER — SIMVASTATIN 20 MG/1
20 TABLET, FILM COATED ORAL AT BEDTIME
Refills: 0 | Status: DISCONTINUED | OUTPATIENT
Start: 2023-05-19 | End: 2023-05-24

## 2023-05-19 RX ORDER — DEXTROSE 50 % IN WATER 50 %
25 SYRINGE (ML) INTRAVENOUS ONCE
Refills: 0 | Status: DISCONTINUED | OUTPATIENT
Start: 2023-05-19 | End: 2023-05-24

## 2023-05-19 RX ORDER — INSULIN LISPRO 100/ML
10 VIAL (ML) SUBCUTANEOUS
Refills: 0 | Status: DISCONTINUED | OUTPATIENT
Start: 2023-05-19 | End: 2023-05-24

## 2023-05-19 RX ORDER — ACETAMINOPHEN 500 MG
1000 TABLET ORAL ONCE
Refills: 0 | Status: COMPLETED | OUTPATIENT
Start: 2023-05-19 | End: 2023-05-19

## 2023-05-19 RX ORDER — SODIUM CHLORIDE 9 MG/ML
1000 INJECTION, SOLUTION INTRAVENOUS
Refills: 0 | Status: DISCONTINUED | OUTPATIENT
Start: 2023-05-19 | End: 2023-05-24

## 2023-05-19 RX ORDER — SEMAGLUTIDE 0.68 MG/ML
0 INJECTION, SOLUTION SUBCUTANEOUS
Qty: 0 | Refills: 1 | DISCHARGE

## 2023-05-19 RX ORDER — ASPIRIN/CALCIUM CARB/MAGNESIUM 324 MG
162 TABLET ORAL ONCE
Refills: 0 | Status: COMPLETED | OUTPATIENT
Start: 2023-05-19 | End: 2023-05-19

## 2023-05-19 RX ORDER — DEXTROSE 50 % IN WATER 50 %
12.5 SYRINGE (ML) INTRAVENOUS ONCE
Refills: 0 | Status: DISCONTINUED | OUTPATIENT
Start: 2023-05-19 | End: 2023-05-24

## 2023-05-19 RX ORDER — ASPIRIN/CALCIUM CARB/MAGNESIUM 324 MG
1 TABLET ORAL
Qty: 0 | Refills: 0 | DISCHARGE

## 2023-05-19 RX ORDER — INSULIN LISPRO 100/ML
30 VIAL (ML) SUBCUTANEOUS
Refills: 0 | DISCHARGE

## 2023-05-19 RX ORDER — METOPROLOL TARTRATE 50 MG
1 TABLET ORAL
Qty: 0 | Refills: 0 | DISCHARGE

## 2023-05-19 RX ORDER — SIMVASTATIN 20 MG/1
1 TABLET, FILM COATED ORAL
Qty: 0 | Refills: 0 | DISCHARGE

## 2023-05-19 RX ORDER — ACETAMINOPHEN 500 MG
650 TABLET ORAL EVERY 6 HOURS
Refills: 0 | Status: DISCONTINUED | OUTPATIENT
Start: 2023-05-19 | End: 2023-05-24

## 2023-05-19 RX ORDER — DEXTROSE 50 % IN WATER 50 %
15 SYRINGE (ML) INTRAVENOUS ONCE
Refills: 0 | Status: DISCONTINUED | OUTPATIENT
Start: 2023-05-19 | End: 2023-05-24

## 2023-05-19 RX ORDER — GLUCAGON INJECTION, SOLUTION 0.5 MG/.1ML
1 INJECTION, SOLUTION SUBCUTANEOUS ONCE
Refills: 0 | Status: DISCONTINUED | OUTPATIENT
Start: 2023-05-19 | End: 2023-05-24

## 2023-05-19 RX ORDER — ASPIRIN/CALCIUM CARB/MAGNESIUM 324 MG
81 TABLET ORAL DAILY
Refills: 0 | Status: DISCONTINUED | OUTPATIENT
Start: 2023-05-20 | End: 2023-05-24

## 2023-05-19 RX ORDER — INSULIN LISPRO 100/ML
VIAL (ML) SUBCUTANEOUS AT BEDTIME
Refills: 0 | Status: DISCONTINUED | OUTPATIENT
Start: 2023-05-19 | End: 2023-05-24

## 2023-05-19 RX ORDER — INSULIN LISPRO 100/ML
VIAL (ML) SUBCUTANEOUS
Refills: 0 | Status: DISCONTINUED | OUTPATIENT
Start: 2023-05-19 | End: 2023-05-23

## 2023-05-19 RX ORDER — ENOXAPARIN SODIUM 100 MG/ML
40 INJECTION SUBCUTANEOUS EVERY 24 HOURS
Refills: 0 | Status: DISCONTINUED | OUTPATIENT
Start: 2023-05-19 | End: 2023-05-23

## 2023-05-19 RX ADMIN — Medication 400 MILLIGRAM(S): at 23:31

## 2023-05-19 RX ADMIN — ENOXAPARIN SODIUM 40 MILLIGRAM(S): 100 INJECTION SUBCUTANEOUS at 17:43

## 2023-05-19 RX ADMIN — SIMVASTATIN 20 MILLIGRAM(S): 20 TABLET, FILM COATED ORAL at 21:16

## 2023-05-19 RX ADMIN — Medication 162 MILLIGRAM(S): at 14:54

## 2023-05-19 NOTE — H&P ADULT - NSHPREVIEWOFSYSTEMS_GEN_ALL_CORE
ROS:    Constitutional: [ ] fevers [ ] chills [ ] weight loss [ ] weight gain  HEENT: [ ] postnasal drip [ ] nasal congestion  CV: [ ] chest pain [ ] orthopnea [ ] palpitations [ ] murmur  Resp: [ ] cough [ ] shortness of breath [ ] dyspnea [ ] wheezing [ ] sputum [ ] hemoptysis  GI: [ ] nausea [ ] vomiting [ ] diarrhea [ ] constipation [ ] abd pain [ ] dysphagia   : [ ] dysuria [ ] nocturia [ ] hematuria [ ] increased urinary frequency  Musculoskeletal: [ ] back pain [ ] myalgias [ ] arthralgias [ ] fracture  Skin: [ ] rash [ ] itch  Neurological: [ ] headache [ ] dizziness [ ] syncope [ ] weakness [ ] numbness  Psychiatric: [ ] anxiety [ ] depression  Endocrine: [ ] diabetes [ ] thyroid problem  Hematologic/Lymphatic: [ ] anemia [ ] bleeding problem  [ ] All other systems negative ROS:    Constitutional: [ ] fevers [ ] chills  HEENT: [ ] postnasal drip [ ] nasal congestion  CV: [x ] chest pain [ ] orthopnea [ ] palpitations   Resp: [ ] cough [x ] shortness of breath [ ] dyspnea [ ] wheezing  GI: [ ] nausea [ ] vomiting [ ] diarrhea [ ] constipation [ ] abd pain  : [ ] dysuria  [ ] increased urinary frequency  Musculoskeletal: [ ] back pain [ ] myalgias [ ] arthralgias   Skin: [ ] rash [ ] itch  Neurological: [ ] headache [ ] dizziness [ ] syncope  Endocrine: [ ] diabetes [ ] thyroid problem  Hematologic/Lymphatic: [ ] anemia [ ] bleeding problem  [x ] All other systems negative

## 2023-05-19 NOTE — ED PROVIDER NOTE - ATTENDING APP SHARED VISIT CONTRIBUTION OF CARE
62-year-old female with history of CAD, CABG, diabetes, hypertension presents with 2 to 3 weeks of chest pain.  Patient was seen by her cardiologist at [facility] and noted to have a new murmur.  Patient is scheduled for an echo in July and all that.  Pain worsening today so came to the ER.  Currently having pain, patient has had exertional shortness of breath for the last few years but does feel like its been slightly worse in the last couple weeks.  EKG without acute ischemic changes we will check labs troponin and admit for ACS rule out

## 2023-05-19 NOTE — ED PROVIDER NOTE - OBJECTIVE STATEMENT
63 y/o F with pmhx of DM, CAD s/p CABG in 2012 presents to the ED c/o CP x 3 weeks. Pt notes that CP [place] subway [place] 3 weeks ago with gradual onset, midsternal, intermittent episodes, described as pressure, worse with inspiration, alleviated with rest, rated as 8/10 at this time with radiation into the lower back. Pt reports associated SOB when she has the pain, worse with exertion and lightheadedness. Pt saw her cardiologist last week and was told that she had a heart murmur which is new for her. Pt is scheduled to have an echo done with cardiologist in July with possible stress test pending ECHO results. Pt denies abd pain, N/V, HA, dizziness, near-syncope/syncope, palpitations, leg swelling or cramping, fever/chills, recent illness or travel, recent hospitalizations or trauma. Cardiologist: Dr. Carlyle Madison in [place].

## 2023-05-19 NOTE — ED ADULT TRIAGE NOTE - CHIEF COMPLAINT QUOTE
pt c/o 2 weeks intermittent worsening chest pain/ pressure with ORELLANA/ SOB, dizziness. PMH: CAD, htn, dm  fs= 83

## 2023-05-19 NOTE — ED PROVIDER NOTE - PHYSICAL EXAMINATION
General: Well appearing in no acute distress, alert and cooperative  Head: Normocephalic, atraumatic  Eyes: PERRLA, no conjunctival injection, no scleral icterus, EOMI  ENMT: Atraumatic external nose and ears, moist mucous membranes, oropharynx clear  Neck: Soft and supple, full ROM without pain, no midline tenderness, no thyromegaly,  no lymphadenopathy  Cardiac: Regular rate and regular rhythm, no murmurs, peripheral pulses 2+ and symmetric in all extremities, no LE edema. No chest wall tenderness.   Resp: Unlabored respiratory effort, lungs CTAB, speaking in full sentences, no wheezes  Abd: Soft, non-tender, non-distended, no guarding or rebound  MSK: Spine midline and non-tender, no CVA tenderness   Skin: Warm and dry, no rashes/abrasions/lacerations  Neuro: AO x 3, moves all extremities symmetrically, Motor strength 5/5 bilaterally UE and LE, sensation grossly intact

## 2023-05-19 NOTE — H&P ADULT - NSHPLABSRESULTS_GEN_ALL_CORE
Personally reviewed labs:                        11.8   8.39  )-----------( 312      ( 19 May 2023 13:46 )             39.4     05-19-23 @ 13:46    138  |  102  |  15             --------------------------< 80     4.4  |  22  | 0.99    eGFR AA: --  eGFR N-AA: --    Calcium: 9.7  Phosphorus: --  Magnesium: --    AST: 29    ALT: 18  AlkPhos: 92  Protein: 7.6  Albumin: 4.4  TBili: 0.4  D-Bili: --    Troponin 14   Probnp 69      RADIOLOGY & ADDITIONAL TESTS:    EKG my independent interpretation: NSR, RSR' similar to prior EKG 2020, no new STD or AMINATA    CXR my independent interpretation: clear lungs

## 2023-05-19 NOTE — H&P ADULT - HISTORY OF PRESENT ILLNESS
62F with PMHxof DM, CAD s/p CABG in 2012 presents with CP x3 weeks 62F with PMHx of DM, HTN, CAD s/p CABG in 2012 presents with CP x3 weeks. Pain is substernal, pressure-like in quality, nonradiating and 8/10. Pain comes and goes and is worse with exertion. She also has ORELLANA as well and chest pressure is worse with deep inspiration. SOB is chronic. She denies LE edema, calf pain, hx DVT/PE, prolonged immobilization. Separately she has some lower back pain not worse with movement in the lumbar area that is not radiating from the chest

## 2023-05-19 NOTE — H&P ADULT - PROBLEM SELECTOR PLAN 3
On admelog 30u TID and no long acting  FSG here 110  -admelog 10u TID for now and uptitrate as needed  -ISS, A1C  -hold metformin and ozempic inpatient

## 2023-05-19 NOTE — ED ADULT NURSE REASSESSMENT NOTE - NS ED NURSE REASSESS COMMENT FT1
20g IV placed to right ac, labs collected and sent. Safety maintained. Pending chest xray.
Pt a&ox4; denies cp, sob, n/v, headache, dizziness, fever/chills. Breathing even, unlabored. Vitals stable. Pending ESSU2 spot. Safety maintained.

## 2023-05-19 NOTE — ED PROVIDER NOTE - CLINICAL SUMMARY MEDICAL DECISION MAKING FREE TEXT BOX
61 y/o F with pmhx of DM, CAD s/p CABG in 2012 presents to the ED c/o CP x 3 weeks. Pt notes that CP [place] 3 weeks [place] with gradual onset, midsternal, intermittent episodes, described as pressure, worse with inspiration, alleviated with rest, rated as 8/10 at this time with radiation into the lower back. Pt reports associated SOB when she has the pain, worse with exertion and lightheadedness.  Vitals signs stable in ED. Physical exam unremarkable.   Plan to assess patient for acute pathology as listed above with labs, CXR and EKG. Pt with multiple risk factors, consider admission for stress and echo given current sx of exertional CP and SOB and high risk. Will administer medications for symptomatic relief if needed, follow-up on results, and reassess.

## 2023-05-19 NOTE — H&P ADULT - PROBLEM SELECTOR PLAN 1
Typical anginal pain - exertional, pressure-like. Hx CABG  -resume ASA, statin, BB  -trop 14, EKG nonischemic compared to prior  -repeat troponin and CK  -wells score 0 but with some chest pain with inspiration. No signs of DVT on exam. Will check D-dimer and if elevated obtain V/Q scan  -echo  -no tearing sharp chest pain and no radiation to back. Radial pulses equal b/l. Not suspicious for aortic dissection  -rest of ischemic eval per primary team cardiologist Dr. Kovacs in AM. Stress test vs angiogram

## 2023-05-19 NOTE — H&P ADULT - NSICDXPASTMEDICALHX_GEN_ALL_CORE_FT
PAST MEDICAL HISTORY:  CAD (coronary artery disease)     Diabetes mellitus type II     HTN (hypertension)

## 2023-05-19 NOTE — CHART NOTE - NSCHARTNOTEFT_GEN_A_CORE
[facility] NIGHT MEDICINE COVERAGE    Notified by RN that pt still having chest pain, 6/10 upon arrival to unit.  EKG, cardiac enzymes, and Ofirmev 1g IVPB ordered.  Pt assessed at bedside.  Pt resting in bed upon writer's arrival, she awoke to voice, endorses her chest pain is 5/10, says she is more comfortable now.  Denies difficulty breathing, HA, N/V/D, or abdominal pain.  VSS.    Vital Signs Last 24 Hrs  T(C): 36.7 (19 May 2023 21:09), Max: 36.8 (19 May 2023 18:59)  T(F): 98.1 (19 May 2023 21:09), Max: 98.2 (19 May 2023 18:59)  HR: 55 (19 May 2023 21:09) (52 - 58)  BP: 155/75 (19 May 2023 21:09) (133/67 - 155/75)  RR: 19 (19 May 2023 21:09) (15 - 19)  SpO2: 98% (19 May 2023 21:09) (98% - 100%)  O2 Parameters below as of 19 May 2023 21:09  Patient On (Oxygen Delivery Method): room air    Physical Exam:  GS: A&Ox3, in NAD, calm  Lungs: CTA b/l, w/o wheezes, rales, or rhonchi  Heart: RRR, +S1S2, +holosystolic murmur, III/VI, w/o rub, or gallop, murmur radiates to carotid arteries on auscultation  Abdomen: Soft, NT/ND, +BS, no rebound or guarding noted  Extremities: Warm, well perfused, 2+ UE/LE pulses b/l, no LE edema noted b/l  Neuro: Non-focal, BAXTER x4    Plan:  -EKG unchanged compared w/ previous, RSR' noted.  -Trops flat - 14 -> 14 -> 15, CK-MB elevated @ 8.0 -> 7.7.  -Ofirmev 1g IVPB as above.  -Monitor on tele.  -TTE ordered for heart murmur - pt made aware of her heart murmur.  -F/u w/ cardiology in AM.  -Case d/w admitting hospitalist, Dr. Lai, who agrees w/ above assessment and plan.    Plan d/w RN and pt, all questions answered.  Pt stable at this time, will continue to monitor.    Murali Merirtt PA-C  Department of Medicine - [facility]  In-House Pager: [phone] [facility] NIGHT MEDICINE COVERAGE    Notified by RN that pt still having chest pain, 6/10, upon arrival to unit.  EKG, cardiac enzymes, and Ofirmev 1g IVPB ordered.  Pt assessed at bedside.  Pt resting in bed upon writer's arrival, she awoke to voice, endorses her chest pain is 5/10, says she is more comfortable now.  Denies difficulty breathing, HA, N/V/D, or abdominal pain.  VSS.    Vital Signs Last 24 Hrs  T(C): 36.7 (19 May 2023 21:09), Max: 36.8 (19 May 2023 18:59)  T(F): 98.1 (19 May 2023 21:09), Max: 98.2 (19 May 2023 18:59)  HR: 55 (19 May 2023 21:09) (52 - 58)  BP: 155/75 (19 May 2023 21:09) (133/67 - 155/75)  RR: 19 (19 May 2023 21:09) (15 - 19)  SpO2: 98% (19 May 2023 21:09) (98% - 100%)  O2 Parameters below as of 19 May 2023 21:09  Patient On (Oxygen Delivery Method): room air    Physical Exam:  GS: A&Ox3, in NAD, calm  Lungs: CTA b/l, w/o wheezes, rales, or rhonchi  Heart: RRR, +S1S2, +holosystolic murmur, III/VI, w/o rub, or gallop, murmur radiates to carotid arteries on auscultation  Abdomen: Soft, NT/ND, +BS, no rebound or guarding noted  Extremities: Warm, well perfused, 2+ UE/LE pulses b/l, no LE edema noted b/l  Neuro: Non-focal, BAXTER x4    Plan:  -EKG unchanged compared w/ previous, RSR' noted.  -Trops flat - 14 -> 14 -> 15, CK-MB elevated - 8.0 -> 7.7.  -Ofirmev 1g IVPB as above.  -Monitor on tele.  -TTE ordered for heart murmur - pt made aware of her heart murmur.  -F/u w/ cardiology in AM.  -Case d/w admitting hospitalist, Dr. Lai, who agrees w/ above assessment and plan.    Plan d/w RN and pt, all questions answered.  Pt stable at this time, will continue to monitor.    Murali Merritt PA-C  Department of Medicine - [facility]  In-House Pager: #[phone]

## 2023-05-19 NOTE — H&P ADULT - NSHPPHYSICALEXAM_GEN_ALL_CORE
PHYSICAL EXAM:  Vital Signs Last 24 Hrs  T(C): 36.8 (05-19-23 @ 18:59)  T(F): 98.2 (05-19-23 @ 18:59), Max: 98.2 (05-19-23 @ 18:59)  HR: 58 (05-19-23 @ 18:59) (52 - 58)  BP: 145/69 (05-19-23 @ 18:59)  BP(mean): --  RR: 18 (05-19-23 @ 18:59) (15 - 18)  SpO2: 100% (05-19-23 @ 18:59) (98% - 100%)  Wt(kg): --    Constitutional: NAD, awake and alert, well developed  EYES: EOMI, conjunctiva clear  ENT:  Normal Hearing, no tonsillar exudates   Neck: Soft and supple , no thyromegaly   Respiratory: Breath sounds are clear bilaterally, No wheezing, rales or rhonchi, no tachypnea, no accessory muscle use  Cardiovascular: S1 and S2, regular rate and rhythm, no Murmurs, gallops or rubs, no JVD, no leg edema  Gastrointestinal: Bowel Sounds present, soft, nontender, nondistended, no guarding, no rebound  Extremities: No cyanosis or clubbing; warm to touch  Vascular: 2+ peripheral pulses lower ex  Neurological: No focal deficits, CN II-XII intact bilaterally, sensation to light touch intact in all extremities.   Musculoskeletal: 5/5 strength b/l upper and lower extremities; no joint swelling.  Skin: No rashes, no ulcerations PHYSICAL EXAM:  Vital Signs Last 24 Hrs  T(C): 36.8 (05-19-23 @ 18:59)  T(F): 98.2 (05-19-23 @ 18:59), Max: 98.2 (05-19-23 @ 18:59)  HR: 58 (05-19-23 @ 18:59) (52 - 58)  BP: 145/69 (05-19-23 @ 18:59)  BP(mean): --  RR: 18 (05-19-23 @ 18:59) (15 - 18)  SpO2: 100% (05-19-23 @ 18:59) (98% - 100%)  Wt(kg): --    Constitutional: NAD, awake and alert, well developed  EYES: EOMI, conjunctiva clear  ENT:  Normal Hearing, no tonsillar exudates   Neck: Soft and supple , no thyromegaly   Respiratory: Breath sounds are clear bilaterally, No wheezing, rales or rhonchi, no tachypnea, no accessory muscle use  Cardiovascular: S1 and S2, regular rate and rhythm, systolic murmur, gallops or rubs, no JVD, no leg edema  Gastrointestinal: Bowel Sounds present, soft, nontender, nondistended, no guarding, no rebound  Extremities: No cyanosis or clubbing; warm to touch  Vascular: 2+ peripheral pulses lower ex  Neurological: No focal deficits, CN II-XII intact bilaterally, sensation to light touch intact in all extremities.   Musculoskeletal: 5/5 strength b/l upper and lower extremities; no joint swelling.  Skin: No rashes, no ulcerations

## 2023-05-19 NOTE — ED ADULT NURSE NOTE - NS ED NURSE PATIENT LEFT UNIT TIME
07/15/21        Deborah [address]      Dear Tamika Bonilla records indicate that you have outstanding lab work and or testing that was ordered for you and has not yet been completed:  Orders Placed This Encou
18:38

## 2023-05-19 NOTE — ED ADULT NURSE NOTE - OBJECTIVE STATEMENT
Patient received rm 19, a&ox4. c/o intermittent, non-radiating, cp x3 weeks, as well as "burping a lot.". hx: T2DM, MALIKAAG (2012). Pt denies cp, sob, n/v, headache, dizziness, fever/chills, vision changes at this time. Breathing even, unlabored. Sinus hamlet on cardiac monitor; pt unsure if this is her baseline, LARS Chi aware. Safety maintained. Pending MD order.

## 2023-05-19 NOTE — ED ADULT NURSE NOTE - NSFALLUNIVINTERV_ED_ALL_ED
Bed/Stretcher in lowest position, wheels locked, appropriate side rails in place/Call bell, personal items and telephone in reach/Instruct patient to call for assistance before getting out of bed/chair/stretcher/Non-slip footwear applied when patient is off stretcher/[place] to call system/Physically safe environment - no spills, clutter or unnecessary equipment/Purposeful proactive rounding/Room/bathroom lighting operational, light cord in reach

## 2023-05-20 LAB
A1C WITH ESTIMATED AVERAGE GLUCOSE RESULT: 7.2 % — HIGH (ref 4–5.6)
ANION GAP SERPL CALC-SCNC: 14 MMOL/L — SIGNIFICANT CHANGE UP (ref 7–14)
APTT BLD: 32 SEC — SIGNIFICANT CHANGE UP (ref 27–36.3)
BASOPHILS # BLD AUTO: 0.05 K/UL — SIGNIFICANT CHANGE UP (ref 0–0.2)
BASOPHILS NFR BLD AUTO: 0.9 % — SIGNIFICANT CHANGE UP (ref 0–2)
BUN SERPL-MCNC: 15 MG/DL — SIGNIFICANT CHANGE UP (ref 7–23)
CALCIUM SERPL-MCNC: 9.4 MG/DL — SIGNIFICANT CHANGE UP (ref 8.4–10.5)
CHLORIDE SERPL-SCNC: 99 MMOL/L — SIGNIFICANT CHANGE UP (ref 98–107)
CHOLEST SERPL-MCNC: 152 MG/DL — SIGNIFICANT CHANGE UP
CK MB BLD-MCNC: 4.9 % — HIGH (ref 0–2.5)
CK MB BLD-MCNC: 5.1 % — HIGH (ref 0–2.5)
CK MB CFR SERPL CALC: 6.7 NG/ML — HIGH
CK MB CFR SERPL CALC: 7.7 NG/ML — HIGH
CK SERPL-CCNC: 131 U/L — SIGNIFICANT CHANGE UP (ref 25–170)
CK SERPL-CCNC: 157 U/L — SIGNIFICANT CHANGE UP (ref 25–170)
CO2 SERPL-SCNC: 22 MMOL/L — SIGNIFICANT CHANGE UP (ref 22–31)
CREAT SERPL-MCNC: 0.97 MG/DL — SIGNIFICANT CHANGE UP (ref 0.5–1.3)
EGFR: 65 ML/MIN/1.73M2 — SIGNIFICANT CHANGE UP
EOSINOPHIL # BLD AUTO: 0.14 K/UL — SIGNIFICANT CHANGE UP (ref 0–0.5)
EOSINOPHIL NFR BLD AUTO: 2.4 % — SIGNIFICANT CHANGE UP (ref 0–6)
ESTIMATED AVERAGE GLUCOSE: 160 — SIGNIFICANT CHANGE UP
GLUCOSE BLDC GLUCOMTR-MCNC: 139 MG/DL — HIGH (ref 70–99)
GLUCOSE BLDC GLUCOMTR-MCNC: 161 MG/DL — HIGH (ref 70–99)
GLUCOSE BLDC GLUCOMTR-MCNC: 194 MG/DL — HIGH (ref 70–99)
GLUCOSE BLDC GLUCOMTR-MCNC: 227 MG/DL — HIGH (ref 70–99)
GLUCOSE SERPL-MCNC: 231 MG/DL — HIGH (ref 70–99)
HCT VFR BLD CALC: 40.7 % — SIGNIFICANT CHANGE UP (ref 34.5–45)
HDLC SERPL-MCNC: 35 MG/DL — LOW
HGB BLD-MCNC: 12.2 G/DL — SIGNIFICANT CHANGE UP (ref 11.5–15.5)
IANC: 2.49 K/UL — SIGNIFICANT CHANGE UP (ref 1.8–7.4)
IMM GRANULOCYTES NFR BLD AUTO: 0.5 % — SIGNIFICANT CHANGE UP (ref 0–0.9)
INR BLD: 0.97 RATIO — SIGNIFICANT CHANGE UP (ref 0.88–1.16)
LIPID PNL WITH DIRECT LDL SERPL: 68 MG/DL — SIGNIFICANT CHANGE UP
LYMPHOCYTES # BLD AUTO: 2.72 K/UL — SIGNIFICANT CHANGE UP (ref 1–3.3)
LYMPHOCYTES # BLD AUTO: 46.3 % — HIGH (ref 13–44)
MAGNESIUM SERPL-MCNC: 1.8 MG/DL — SIGNIFICANT CHANGE UP (ref 1.6–2.6)
MCHC RBC-ENTMCNC: 18.4 PG — LOW (ref 27–34)
MCHC RBC-ENTMCNC: 30 GM/DL — LOW (ref 32–36)
MCV RBC AUTO: 61.3 FL — LOW (ref 80–100)
MONOCYTES # BLD AUTO: 0.44 K/UL — SIGNIFICANT CHANGE UP (ref 0–0.9)
MONOCYTES NFR BLD AUTO: 7.5 % — SIGNIFICANT CHANGE UP (ref 2–14)
NEUTROPHILS # BLD AUTO: 2.49 K/UL — SIGNIFICANT CHANGE UP (ref 1.8–7.4)
NEUTROPHILS NFR BLD AUTO: 42.4 % — LOW (ref 43–77)
NON HDL CHOLESTEROL: 117 MG/DL — SIGNIFICANT CHANGE UP
NRBC # BLD: 0 /100 WBCS — SIGNIFICANT CHANGE UP (ref 0–0)
NRBC # FLD: 0 K/UL — SIGNIFICANT CHANGE UP (ref 0–0)
PHOSPHATE SERPL-MCNC: 3.7 MG/DL — SIGNIFICANT CHANGE UP (ref 2.5–4.5)
PLATELET # BLD AUTO: 288 K/UL — SIGNIFICANT CHANGE UP (ref 150–400)
POTASSIUM SERPL-MCNC: 4.4 MMOL/L — SIGNIFICANT CHANGE UP (ref 3.5–5.3)
POTASSIUM SERPL-SCNC: 4.4 MMOL/L — SIGNIFICANT CHANGE UP (ref 3.5–5.3)
PROTHROM AB SERPL-ACNC: 11.2 SEC — SIGNIFICANT CHANGE UP (ref 10.5–13.4)
RBC # BLD: 6.64 M/UL — HIGH (ref 3.8–5.2)
RBC # FLD: 18.1 % — HIGH (ref 10.3–14.5)
SODIUM SERPL-SCNC: 135 MMOL/L — SIGNIFICANT CHANGE UP (ref 135–145)
TRIGL SERPL-MCNC: 246 MG/DL — HIGH
TROPONIN T, HIGH SENSITIVITY RESULT: 14 NG/L — SIGNIFICANT CHANGE UP
TROPONIN T, HIGH SENSITIVITY RESULT: 15 NG/L — SIGNIFICANT CHANGE UP
WBC # BLD: 5.87 K/UL — SIGNIFICANT CHANGE UP (ref 3.8–10.5)
WBC # FLD AUTO: 5.87 K/UL — SIGNIFICANT CHANGE UP (ref 3.8–10.5)

## 2023-05-20 RX ORDER — POLYETHYLENE GLYCOL 3350 17 G/17G
17 POWDER, FOR SOLUTION ORAL AT BEDTIME
Refills: 0 | Status: DISCONTINUED | OUTPATIENT
Start: 2023-05-20 | End: 2023-05-24

## 2023-05-20 RX ADMIN — ENOXAPARIN SODIUM 40 MILLIGRAM(S): 100 INJECTION SUBCUTANEOUS at 17:48

## 2023-05-20 RX ADMIN — Medication 10 UNIT(S): at 08:58

## 2023-05-20 RX ADMIN — Medication 10 UNIT(S): at 17:44

## 2023-05-20 RX ADMIN — Medication 1000 MILLIGRAM(S): at 00:00

## 2023-05-20 RX ADMIN — Medication 10 UNIT(S): at 12:58

## 2023-05-20 RX ADMIN — Medication 81 MILLIGRAM(S): at 10:56

## 2023-05-20 RX ADMIN — Medication 1: at 17:43

## 2023-05-20 RX ADMIN — SIMVASTATIN 20 MILLIGRAM(S): 20 TABLET, FILM COATED ORAL at 22:08

## 2023-05-20 RX ADMIN — Medication 2: at 08:58

## 2023-05-20 RX ADMIN — Medication 1: at 12:58

## 2023-05-20 NOTE — PROGRESS NOTE ADULT - PROBLEM SELECTOR PLAN 1
Typical anginal pain - exertional, pressure-like. Hx CABG  -resume ASA, statin, BB  -trop 14, EKG nonischemic compared to prior  -repeat troponin and CK  -wells score 0 but with some chest pain with inspiration. No signs of DVT on exam. Will check D-dimer and if elevated obtain V/Q scan  -echo

## 2023-05-20 NOTE — PATIENT PROFILE ADULT - FUNCTIONAL ASSESSMENT - DAILY ACTIVITY ASSESSMENT TYPE
3rd call regarding clinic closure and hub phone given again to schedule virtual care or to be called when the clinic reopens.  
Admission

## 2023-05-20 NOTE — PATIENT PROFILE ADULT - NSPROMEDSDISPOSITION_GEN_A_NUR
Final Anesthesia Post-op Assessment    Patient: Wan Mccullough  Procedure(s) Performed: COLONOSCOPY  Anesthesia type: MAC    Vitals Value Taken Time   Temp 97 01/22/21 1445   Pulse 57 01/22/21 1445   Resp 18 01/22/21 1445   SpO2 97 01/22/21 1445   /87 01/22/21 1445         Patient Location: Phase II  Post-op Vital Signs:stable  Level of Consciousness: participates in exam, alert, oriented, awake and follows commands  Respiratory Status: spontaneous ventilation and room air  Cardiovascular blood pressure returned to baseline  Hydration: euvolemic  Pain Management: well controlled  Handoff: Handoff to receiving clinician was performed and questions were answered  Vomiting: none   Nausea: None  Airway Patency:patent  Post-op Assessment: awake, alert, appropriately conversant, or baseline, no complications and patient tolerated procedure well with no complications      No complications documented.    Pt informed not to take home medications while in hospital/bedside

## 2023-05-20 NOTE — PROGRESS NOTE ADULT - PROBLEM SELECTOR PLAN 1
Typical anginal pain - exertional, pressure-like. Hx CABG  -resume ASA, statin, BB  -trop 14, EKG nonischemic compared to prior  -repeat troponin and CK  -wells score 0 but with some chest pain with inspiration. No signs of DVT on exam. Will check D-dimer and if elevated obtain V/Q scan  -f/u echo

## 2023-05-21 LAB
ANION GAP SERPL CALC-SCNC: 17 MMOL/L — HIGH (ref 7–14)
BASOPHILS # BLD AUTO: 0.06 K/UL — SIGNIFICANT CHANGE UP (ref 0–0.2)
BASOPHILS NFR BLD AUTO: 0.9 % — SIGNIFICANT CHANGE UP (ref 0–2)
BUN SERPL-MCNC: 16 MG/DL — SIGNIFICANT CHANGE UP (ref 7–23)
CALCIUM SERPL-MCNC: 9.4 MG/DL — SIGNIFICANT CHANGE UP (ref 8.4–10.5)
CHLORIDE SERPL-SCNC: 99 MMOL/L — SIGNIFICANT CHANGE UP (ref 98–107)
CO2 SERPL-SCNC: 20 MMOL/L — LOW (ref 22–31)
CREAT SERPL-MCNC: 0.92 MG/DL — SIGNIFICANT CHANGE UP (ref 0.5–1.3)
EGFR: 70 ML/MIN/1.73M2 — SIGNIFICANT CHANGE UP
EOSINOPHIL # BLD AUTO: 0.14 K/UL — SIGNIFICANT CHANGE UP (ref 0–0.5)
EOSINOPHIL NFR BLD AUTO: 2.2 % — SIGNIFICANT CHANGE UP (ref 0–6)
GLUCOSE BLDC GLUCOMTR-MCNC: 188 MG/DL — HIGH (ref 70–99)
GLUCOSE BLDC GLUCOMTR-MCNC: 202 MG/DL — HIGH (ref 70–99)
GLUCOSE BLDC GLUCOMTR-MCNC: 215 MG/DL — HIGH (ref 70–99)
GLUCOSE BLDC GLUCOMTR-MCNC: 248 MG/DL — HIGH (ref 70–99)
GLUCOSE SERPL-MCNC: 216 MG/DL — HIGH (ref 70–99)
HCT VFR BLD CALC: 40.8 % — SIGNIFICANT CHANGE UP (ref 34.5–45)
HGB BLD-MCNC: 12.3 G/DL — SIGNIFICANT CHANGE UP (ref 11.5–15.5)
IANC: 3.5 K/UL — SIGNIFICANT CHANGE UP (ref 1.8–7.4)
IMM GRANULOCYTES NFR BLD AUTO: 0.5 % — SIGNIFICANT CHANGE UP (ref 0–0.9)
LYMPHOCYTES # BLD AUTO: 2.23 K/UL — SIGNIFICANT CHANGE UP (ref 1–3.3)
LYMPHOCYTES # BLD AUTO: 34.7 % — SIGNIFICANT CHANGE UP (ref 13–44)
MAGNESIUM SERPL-MCNC: 1.8 MG/DL — SIGNIFICANT CHANGE UP (ref 1.6–2.6)
MCHC RBC-ENTMCNC: 19 PG — LOW (ref 27–34)
MCHC RBC-ENTMCNC: 30.1 GM/DL — LOW (ref 32–36)
MCV RBC AUTO: 63.2 FL — LOW (ref 80–100)
MONOCYTES # BLD AUTO: 0.47 K/UL — SIGNIFICANT CHANGE UP (ref 0–0.9)
MONOCYTES NFR BLD AUTO: 7.3 % — SIGNIFICANT CHANGE UP (ref 2–14)
NEUTROPHILS # BLD AUTO: 3.5 K/UL — SIGNIFICANT CHANGE UP (ref 1.8–7.4)
NEUTROPHILS NFR BLD AUTO: 54.4 % — SIGNIFICANT CHANGE UP (ref 43–77)
NRBC # BLD: 0 /100 WBCS — SIGNIFICANT CHANGE UP (ref 0–0)
NRBC # FLD: 0 K/UL — SIGNIFICANT CHANGE UP (ref 0–0)
PHOSPHATE SERPL-MCNC: 3.7 MG/DL — SIGNIFICANT CHANGE UP (ref 2.5–4.5)
PLATELET # BLD AUTO: 264 K/UL — SIGNIFICANT CHANGE UP (ref 150–400)
POTASSIUM SERPL-MCNC: 4 MMOL/L — SIGNIFICANT CHANGE UP (ref 3.5–5.3)
POTASSIUM SERPL-SCNC: 4 MMOL/L — SIGNIFICANT CHANGE UP (ref 3.5–5.3)
RBC # BLD: 6.46 M/UL — HIGH (ref 3.8–5.2)
RBC # FLD: 18.2 % — HIGH (ref 10.3–14.5)
SODIUM SERPL-SCNC: 136 MMOL/L — SIGNIFICANT CHANGE UP (ref 135–145)
WBC # BLD: 6.43 K/UL — SIGNIFICANT CHANGE UP (ref 3.8–10.5)
WBC # FLD AUTO: 6.43 K/UL — SIGNIFICANT CHANGE UP (ref 3.8–10.5)

## 2023-05-21 RX ADMIN — Medication 1: at 17:24

## 2023-05-21 RX ADMIN — Medication 2: at 08:36

## 2023-05-21 RX ADMIN — Medication 2: at 12:20

## 2023-05-21 RX ADMIN — Medication 81 MILLIGRAM(S): at 12:20

## 2023-05-21 RX ADMIN — Medication 10 UNIT(S): at 17:24

## 2023-05-21 RX ADMIN — SIMVASTATIN 20 MILLIGRAM(S): 20 TABLET, FILM COATED ORAL at 21:51

## 2023-05-21 RX ADMIN — Medication 25 MILLIGRAM(S): at 05:42

## 2023-05-21 RX ADMIN — Medication 10 UNIT(S): at 08:36

## 2023-05-21 RX ADMIN — ENOXAPARIN SODIUM 40 MILLIGRAM(S): 100 INJECTION SUBCUTANEOUS at 17:25

## 2023-05-21 RX ADMIN — Medication 10 UNIT(S): at 12:20

## 2023-05-22 LAB
ANION GAP SERPL CALC-SCNC: 12 MMOL/L — SIGNIFICANT CHANGE UP (ref 7–14)
BASOPHILS # BLD AUTO: 0.06 K/UL — SIGNIFICANT CHANGE UP (ref 0–0.2)
BASOPHILS NFR BLD AUTO: 1 % — SIGNIFICANT CHANGE UP (ref 0–2)
BUN SERPL-MCNC: 18 MG/DL — SIGNIFICANT CHANGE UP (ref 7–23)
CALCIUM SERPL-MCNC: 9.4 MG/DL — SIGNIFICANT CHANGE UP (ref 8.4–10.5)
CHLORIDE SERPL-SCNC: 99 MMOL/L — SIGNIFICANT CHANGE UP (ref 98–107)
CO2 SERPL-SCNC: 21 MMOL/L — LOW (ref 22–31)
CREAT SERPL-MCNC: 0.93 MG/DL — SIGNIFICANT CHANGE UP (ref 0.5–1.3)
EGFR: 69 ML/MIN/1.73M2 — SIGNIFICANT CHANGE UP
EOSINOPHIL # BLD AUTO: 0.14 K/UL — SIGNIFICANT CHANGE UP (ref 0–0.5)
EOSINOPHIL NFR BLD AUTO: 2.3 % — SIGNIFICANT CHANGE UP (ref 0–6)
GLUCOSE BLDC GLUCOMTR-MCNC: 184 MG/DL — HIGH (ref 70–99)
GLUCOSE BLDC GLUCOMTR-MCNC: 228 MG/DL — HIGH (ref 70–99)
GLUCOSE BLDC GLUCOMTR-MCNC: 263 MG/DL — HIGH (ref 70–99)
GLUCOSE BLDC GLUCOMTR-MCNC: 277 MG/DL — HIGH (ref 70–99)
GLUCOSE BLDC GLUCOMTR-MCNC: 316 MG/DL — HIGH (ref 70–99)
GLUCOSE SERPL-MCNC: 277 MG/DL — HIGH (ref 70–99)
HCT VFR BLD CALC: 41.5 % — SIGNIFICANT CHANGE UP (ref 34.5–45)
HGB BLD-MCNC: 12.6 G/DL — SIGNIFICANT CHANGE UP (ref 11.5–15.5)
IANC: 3.01 K/UL — SIGNIFICANT CHANGE UP (ref 1.8–7.4)
IMM GRANULOCYTES NFR BLD AUTO: 0.5 % — SIGNIFICANT CHANGE UP (ref 0–0.9)
LYMPHOCYTES # BLD AUTO: 2.47 K/UL — SIGNIFICANT CHANGE UP (ref 1–3.3)
LYMPHOCYTES # BLD AUTO: 39.8 % — SIGNIFICANT CHANGE UP (ref 13–44)
MAGNESIUM SERPL-MCNC: 1.7 MG/DL — SIGNIFICANT CHANGE UP (ref 1.6–2.6)
MCHC RBC-ENTMCNC: 18.6 PG — LOW (ref 27–34)
MCHC RBC-ENTMCNC: 30.4 GM/DL — LOW (ref 32–36)
MCV RBC AUTO: 61.2 FL — LOW (ref 80–100)
MONOCYTES # BLD AUTO: 0.49 K/UL — SIGNIFICANT CHANGE UP (ref 0–0.9)
MONOCYTES NFR BLD AUTO: 7.9 % — SIGNIFICANT CHANGE UP (ref 2–14)
NEUTROPHILS # BLD AUTO: 3.01 K/UL — SIGNIFICANT CHANGE UP (ref 1.8–7.4)
NEUTROPHILS NFR BLD AUTO: 48.5 % — SIGNIFICANT CHANGE UP (ref 43–77)
NRBC # BLD: 0 /100 WBCS — SIGNIFICANT CHANGE UP (ref 0–0)
NRBC # FLD: 0 K/UL — SIGNIFICANT CHANGE UP (ref 0–0)
PHOSPHATE SERPL-MCNC: 3.6 MG/DL — SIGNIFICANT CHANGE UP (ref 2.5–4.5)
PLATELET # BLD AUTO: 290 K/UL — SIGNIFICANT CHANGE UP (ref 150–400)
POTASSIUM SERPL-MCNC: 4.2 MMOL/L — SIGNIFICANT CHANGE UP (ref 3.5–5.3)
POTASSIUM SERPL-SCNC: 4.2 MMOL/L — SIGNIFICANT CHANGE UP (ref 3.5–5.3)
RBC # BLD: 6.78 M/UL — HIGH (ref 3.8–5.2)
RBC # FLD: 17.8 % — HIGH (ref 10.3–14.5)
SODIUM SERPL-SCNC: 132 MMOL/L — LOW (ref 135–145)
WBC # BLD: 6.2 K/UL — SIGNIFICANT CHANGE UP (ref 3.8–10.5)
WBC # FLD AUTO: 6.2 K/UL — SIGNIFICANT CHANGE UP (ref 3.8–10.5)

## 2023-05-22 PROCEDURE — 93306 TTE W/DOPPLER COMPLETE: CPT | Mod: 26

## 2023-05-22 RX ADMIN — Medication 2: at 17:40

## 2023-05-22 RX ADMIN — Medication 3: at 12:57

## 2023-05-22 RX ADMIN — SIMVASTATIN 20 MILLIGRAM(S): 20 TABLET, FILM COATED ORAL at 22:21

## 2023-05-22 RX ADMIN — Medication 10 UNIT(S): at 10:40

## 2023-05-22 RX ADMIN — ENOXAPARIN SODIUM 40 MILLIGRAM(S): 100 INJECTION SUBCUTANEOUS at 17:41

## 2023-05-22 RX ADMIN — Medication 10 UNIT(S): at 17:41

## 2023-05-22 RX ADMIN — Medication 81 MILLIGRAM(S): at 12:56

## 2023-05-22 RX ADMIN — Medication 25 MILLIGRAM(S): at 05:50

## 2023-05-22 RX ADMIN — Medication 3: at 10:39

## 2023-05-22 RX ADMIN — Medication 10 UNIT(S): at 12:58

## 2023-05-23 LAB
ANION GAP SERPL CALC-SCNC: 14 MMOL/L — SIGNIFICANT CHANGE UP (ref 7–14)
BUN SERPL-MCNC: 22 MG/DL — SIGNIFICANT CHANGE UP (ref 7–23)
CALCIUM SERPL-MCNC: 9.1 MG/DL — SIGNIFICANT CHANGE UP (ref 8.4–10.5)
CHLORIDE SERPL-SCNC: 100 MMOL/L — SIGNIFICANT CHANGE UP (ref 98–107)
CO2 SERPL-SCNC: 19 MMOL/L — LOW (ref 22–31)
CREAT SERPL-MCNC: 0.97 MG/DL — SIGNIFICANT CHANGE UP (ref 0.5–1.3)
EGFR: 65 ML/MIN/1.73M2 — SIGNIFICANT CHANGE UP
GLUCOSE BLDC GLUCOMTR-MCNC: 232 MG/DL — HIGH (ref 70–99)
GLUCOSE BLDC GLUCOMTR-MCNC: 279 MG/DL — HIGH (ref 70–99)
GLUCOSE BLDC GLUCOMTR-MCNC: 290 MG/DL — HIGH (ref 70–99)
GLUCOSE BLDC GLUCOMTR-MCNC: 297 MG/DL — HIGH (ref 70–99)
GLUCOSE BLDC GLUCOMTR-MCNC: 338 MG/DL — HIGH (ref 70–99)
GLUCOSE SERPL-MCNC: 249 MG/DL — HIGH (ref 70–99)
HCT VFR BLD CALC: 39.2 % — SIGNIFICANT CHANGE UP (ref 34.5–45)
HGB BLD-MCNC: 12.1 G/DL — SIGNIFICANT CHANGE UP (ref 11.5–15.5)
MAGNESIUM SERPL-MCNC: 1.7 MG/DL — SIGNIFICANT CHANGE UP (ref 1.6–2.6)
MCHC RBC-ENTMCNC: 18.8 PG — LOW (ref 27–34)
MCHC RBC-ENTMCNC: 30.9 GM/DL — LOW (ref 32–36)
MCV RBC AUTO: 60.9 FL — LOW (ref 80–100)
NRBC # BLD: 0 /100 WBCS — SIGNIFICANT CHANGE UP (ref 0–0)
NRBC # FLD: 0 K/UL — SIGNIFICANT CHANGE UP (ref 0–0)
PHOSPHATE SERPL-MCNC: 3.6 MG/DL — SIGNIFICANT CHANGE UP (ref 2.5–4.5)
PLATELET # BLD AUTO: 260 K/UL — SIGNIFICANT CHANGE UP (ref 150–400)
POTASSIUM SERPL-MCNC: 4.2 MMOL/L — SIGNIFICANT CHANGE UP (ref 3.5–5.3)
POTASSIUM SERPL-SCNC: 4.2 MMOL/L — SIGNIFICANT CHANGE UP (ref 3.5–5.3)
RBC # BLD: 6.44 M/UL — HIGH (ref 3.8–5.2)
RBC # FLD: 17.5 % — HIGH (ref 10.3–14.5)
SODIUM SERPL-SCNC: 133 MMOL/L — LOW (ref 135–145)
WBC # BLD: 6.57 K/UL — SIGNIFICANT CHANGE UP (ref 3.8–10.5)
WBC # FLD AUTO: 6.57 K/UL — SIGNIFICANT CHANGE UP (ref 3.8–10.5)

## 2023-05-23 PROCEDURE — 93010 ELECTROCARDIOGRAM REPORT: CPT

## 2023-05-23 PROCEDURE — 92928 PRQ TCAT PLMT NTRAC ST 1 LES: CPT | Mod: RC

## 2023-05-23 PROCEDURE — 99152 MOD SED SAME PHYS/QHP 5/>YRS: CPT | Mod: 59

## 2023-05-23 PROCEDURE — 93459 L HRT ART/GRFT ANGIO: CPT | Mod: 26,59

## 2023-05-23 RX ORDER — DIPHENHYDRAMINE HCL 50 MG
25 CAPSULE ORAL ONCE
Refills: 0 | Status: COMPLETED | OUTPATIENT
Start: 2023-05-23 | End: 2023-05-23

## 2023-05-23 RX ORDER — INSULIN LISPRO 100/ML
VIAL (ML) SUBCUTANEOUS EVERY 6 HOURS
Refills: 0 | Status: DISCONTINUED | OUTPATIENT
Start: 2023-05-23 | End: 2023-05-23

## 2023-05-23 RX ORDER — CLOPIDOGREL BISULFATE 75 MG/1
75 TABLET, FILM COATED ORAL DAILY
Refills: 0 | Status: DISCONTINUED | OUTPATIENT
Start: 2023-05-24 | End: 2023-05-24

## 2023-05-23 RX ORDER — HYDROCORTISONE 20 MG
100 TABLET ORAL ONCE
Refills: 0 | Status: COMPLETED | OUTPATIENT
Start: 2023-05-23 | End: 2023-05-23

## 2023-05-23 RX ORDER — INSULIN LISPRO 100/ML
VIAL (ML) SUBCUTANEOUS
Refills: 0 | Status: DISCONTINUED | OUTPATIENT
Start: 2023-05-23 | End: 2023-05-24

## 2023-05-23 RX ORDER — ENOXAPARIN SODIUM 100 MG/ML
40 INJECTION SUBCUTANEOUS EVERY 24 HOURS
Refills: 0 | Status: DISCONTINUED | OUTPATIENT
Start: 2023-05-24 | End: 2023-05-24

## 2023-05-23 RX ORDER — SODIUM CHLORIDE 9 MG/ML
500 INJECTION INTRAMUSCULAR; INTRAVENOUS; SUBCUTANEOUS
Refills: 0 | Status: DISCONTINUED | OUTPATIENT
Start: 2023-05-23 | End: 2023-05-24

## 2023-05-23 RX ADMIN — SODIUM CHLORIDE 100 MILLILITER(S): 9 INJECTION INTRAMUSCULAR; INTRAVENOUS; SUBCUTANEOUS at 19:24

## 2023-05-23 RX ADMIN — Medication 4: at 19:35

## 2023-05-23 RX ADMIN — Medication 1: at 21:55

## 2023-05-23 RX ADMIN — Medication 3: at 06:53

## 2023-05-23 RX ADMIN — Medication 25 MILLIGRAM(S): at 16:12

## 2023-05-23 RX ADMIN — Medication 3: at 12:28

## 2023-05-23 RX ADMIN — Medication 25 MILLIGRAM(S): at 05:08

## 2023-05-23 RX ADMIN — Medication 50 MILLIGRAM(S): at 09:29

## 2023-05-23 RX ADMIN — Medication 100 MILLIGRAM(S): at 16:08

## 2023-05-23 RX ADMIN — SIMVASTATIN 20 MILLIGRAM(S): 20 TABLET, FILM COATED ORAL at 21:29

## 2023-05-23 RX ADMIN — Medication 81 MILLIGRAM(S): at 12:04

## 2023-05-23 RX ADMIN — Medication 50 MILLIGRAM(S): at 13:02

## 2023-05-23 RX ADMIN — Medication 2: at 00:54

## 2023-05-23 NOTE — CHART NOTE - NSCHARTNOTEFT_GEN_A_CORE
Pt on schedule for cardiac cath today. Pt reports hx of only hives post angiogram. Discussed with MD Kovacs, will dose Prednisone 50mg STAT now. Communicated with charge JAIME Easley in cath lab.     Ciara Osullivan NP  [id number]

## 2023-05-23 NOTE — PROVIDER CONTACT NOTE (OTHER) - ASSESSMENT
patient asymptomatic, resting comfortably in bed with chest rise and fall
patient asymptomatic, denies increased thirst, frequent urination. patient resting comfortably with chest rise and fall.

## 2023-05-23 NOTE — CHART NOTE - NSCHARTNOTEFT_GEN_A_CORE
s/p cardiac catheterization via RFA access with perclose closure device; site soft, nontender, no  hematoma, superficial tract ooze noted with spot of blood on dressing.  Safeguard device 30ccair placed over new clean dressing, non-occulsive pressure with 2+ right DP.  Dressing remained clean with no bleed  to remain in place for 4hours, CCU ACP notified and to remove Safeguard and evaluate  primary team made aware s/p cardiac catheterization via RFA access with perclose closure device; site soft, nontender, no  hematoma, superficial tract ooze noted with spot of blood on dressing. Manual pressure/tract pinch held [id number] with improvement, Safeguard device 30cc air placed over new clean dressing, non-occlusive pressure with 2+ right DP.  Dressing remained clean with no bleed  to remain in place for 4hours, CCU ACP notified and to remove Safeguard and evaluate  primary team made aware

## 2023-05-23 NOTE — PROVIDER CONTACT NOTE (OTHER) - ACTION/TREATMENT ORDERED:
provider made aware. administer insulin and continue to monitor.
provider made aware. administer metoprolol and continue to monitor.

## 2023-05-23 NOTE — PROGRESS NOTE ADULT - TIME BILLING
- Review of records, telemetry, vital signs and daily labs.   - General and cardiovascular physical examination.  - Generation of cardiovascular treatment plan.  - Coordination of care.      Patient was seen and examined by me on 5/20/23,interim events noted,labs and radiology studies reviewed.  Patrice Kovacs MD,FACC.  [address] [place], [place] [id number].  [phone]
- Review of records, telemetry, vital signs and daily labs.   - General and cardiovascular physical examination.  - Generation of cardiovascular treatment plan.  - Coordination of care.      Patient was seen and examined by me on 5/23/23,interim events noted,labs and radiology studies reviewed.  Patrice Kovacs MD,FACC.  [address] [place], [place] [id number].  [phone]
- Review of records, telemetry, vital signs and daily labs.   - General and cardiovascular physical examination.  - Generation of cardiovascular treatment plan.  - Coordination of care.      Patient was seen and examined by me on 5/22/23,interim events noted,labs and radiology studies reviewed.  Patrice Kovacs MD,FACC.  [address] [place], [place] [id number].  [phone]
- Review of records, telemetry, vital signs and daily labs.   - General and cardiovascular physical examination.  - Generation of cardiovascular treatment plan.  - Coordination of care.      Patient was seen and examined by me on 5/21/23,interim events noted,labs and radiology studies reviewed.  Patrice Kovacs MD,FACC.  [address] [place], [place] [id number].  [phone]

## 2023-05-23 NOTE — CHART NOTE - NSCHARTNOTEFT_GEN_A_CORE
SAFEGUARD REMOVAL NOTE    Pulses in the right lower extremity are palpable. The patient was placed in the supine position. The insertion site was identified and the Safeguard deflated per protocol. Safeguard removed without any complications. No oozing, bleeding or hematoma. Positive peripheral pulses. Good capillary refill. RN to monitor site for oozing, bleeding and hematoma.

## 2023-05-23 NOTE — CHART NOTE - NSCHARTNOTEFT_GEN_A_CORE
known IV contrast allergy identified with reaction of headache/chest pain/hives scheduled for cardiac catheterization today for chest pain and valvular disease, advised by Dr PAXTON Mcmillan to give Prednisone 50mg po x2 doses;   will give additional Solucortef 100mg IV x1 and Benadryl 25mg IV x1 on call to cath lab.     pre cath hydration protocol reviewed, NO IVF given due to significant valvluar disease

## 2023-05-24 ENCOUNTER — TRANSCRIPTION ENCOUNTER (OUTPATIENT)
Age: 63
End: 2023-05-24

## 2023-05-24 VITALS
DIASTOLIC BLOOD PRESSURE: 63 MMHG | HEART RATE: 78 BPM | OXYGEN SATURATION: 98 % | TEMPERATURE: 98 F | SYSTOLIC BLOOD PRESSURE: 122 MMHG | RESPIRATION RATE: 17 BRPM

## 2023-05-24 LAB
ANION GAP SERPL CALC-SCNC: 15 MMOL/L — HIGH (ref 7–14)
BUN SERPL-MCNC: 22 MG/DL — SIGNIFICANT CHANGE UP (ref 7–23)
CALCIUM SERPL-MCNC: 9.3 MG/DL — SIGNIFICANT CHANGE UP (ref 8.4–10.5)
CHLORIDE SERPL-SCNC: 99 MMOL/L — SIGNIFICANT CHANGE UP (ref 98–107)
CO2 SERPL-SCNC: 20 MMOL/L — LOW (ref 22–31)
CREAT SERPL-MCNC: 0.98 MG/DL — SIGNIFICANT CHANGE UP (ref 0.5–1.3)
EGFR: 64 ML/MIN/1.73M2 — SIGNIFICANT CHANGE UP
GLUCOSE BLDC GLUCOMTR-MCNC: 307 MG/DL — HIGH (ref 70–99)
GLUCOSE BLDC GLUCOMTR-MCNC: 320 MG/DL — HIGH (ref 70–99)
GLUCOSE SERPL-MCNC: 293 MG/DL — HIGH (ref 70–99)
HCT VFR BLD CALC: 37.9 % — SIGNIFICANT CHANGE UP (ref 34.5–45)
HGB BLD-MCNC: 11.8 G/DL — SIGNIFICANT CHANGE UP (ref 11.5–15.5)
MAGNESIUM SERPL-MCNC: 1.8 MG/DL — SIGNIFICANT CHANGE UP (ref 1.6–2.6)
MCHC RBC-ENTMCNC: 19.2 PG — LOW (ref 27–34)
MCHC RBC-ENTMCNC: 31.1 GM/DL — LOW (ref 32–36)
MCV RBC AUTO: 61.8 FL — LOW (ref 80–100)
NRBC # BLD: 0 /100 WBCS — SIGNIFICANT CHANGE UP (ref 0–0)
NRBC # FLD: 0 K/UL — SIGNIFICANT CHANGE UP (ref 0–0)
PHOSPHATE SERPL-MCNC: 3.7 MG/DL — SIGNIFICANT CHANGE UP (ref 2.5–4.5)
PLATELET # BLD AUTO: 266 K/UL — SIGNIFICANT CHANGE UP (ref 150–400)
POTASSIUM SERPL-MCNC: 4 MMOL/L — SIGNIFICANT CHANGE UP (ref 3.5–5.3)
POTASSIUM SERPL-SCNC: 4 MMOL/L — SIGNIFICANT CHANGE UP (ref 3.5–5.3)
RBC # BLD: 6.13 M/UL — HIGH (ref 3.8–5.2)
RBC # FLD: 17.9 % — HIGH (ref 10.3–14.5)
SODIUM SERPL-SCNC: 134 MMOL/L — LOW (ref 135–145)
WBC # BLD: 12.39 K/UL — HIGH (ref 3.8–10.5)
WBC # FLD AUTO: 12.39 K/UL — HIGH (ref 3.8–10.5)

## 2023-05-24 RX ORDER — POLYETHYLENE GLYCOL 3350 17 G/17G
17 POWDER, FOR SOLUTION ORAL
Qty: 0 | Refills: 0 | DISCHARGE
Start: 2023-05-24

## 2023-05-24 RX ORDER — CLOPIDOGREL BISULFATE 75 MG/1
1 TABLET, FILM COATED ORAL
Qty: 30 | Refills: 0
Start: 2023-05-24 | End: 2023-06-22

## 2023-05-24 RX ADMIN — Medication 10 UNIT(S): at 12:41

## 2023-05-24 RX ADMIN — CLOPIDOGREL BISULFATE 75 MILLIGRAM(S): 75 TABLET, FILM COATED ORAL at 12:44

## 2023-05-24 RX ADMIN — Medication 81 MILLIGRAM(S): at 12:44

## 2023-05-24 RX ADMIN — Medication 10 UNIT(S): at 08:40

## 2023-05-24 RX ADMIN — Medication 4: at 12:42

## 2023-05-24 RX ADMIN — Medication 650 MILLIGRAM(S): at 12:56

## 2023-05-24 RX ADMIN — Medication 25 MILLIGRAM(S): at 08:42

## 2023-05-24 RX ADMIN — ENOXAPARIN SODIUM 40 MILLIGRAM(S): 100 INJECTION SUBCUTANEOUS at 05:54

## 2023-05-24 RX ADMIN — Medication 4: at 08:40

## 2023-05-24 NOTE — DISCHARGE NOTE NURSING/CASE MANAGEMENT/SOCIAL WORK - NSDCPNINST_GEN_ALL_CORE
Patient to return to Emergency Department with unresolved pain, shortness of breath, fever, nausea, vomiting, or diarrhea.

## 2023-05-24 NOTE — PROGRESS NOTE ADULT - PROBLEM SELECTOR PLAN 6
Lovenox 40mg qd  DASH/TLC CC diet

## 2023-05-24 NOTE — DISCHARGE NOTE PROVIDER - NSDCCPCAREPLAN_GEN_ALL_CORE_FT
PRINCIPAL DISCHARGE DIAGNOSIS  Diagnosis: Chest pain  Assessment and Plan of Treatment: You presented with chest pain and you were seen by the cardiologist in the hospital and also had cardiac catheterization which showed _____. Please contiue with asprin, plavix and atorvastatin as ordered and follow up with outpatient cardiologist.     PRINCIPAL DISCHARGE DIAGNOSIS  Diagnosis: Chest pain  Assessment and Plan of Treatment: You presented with chest pain and you were seen by the cardiologist in the hospital and also had cardiac catheterization which showed _____. Please contiue with asprin, plavix and atorvastatin as ordered and follow up with outpatient cardiologist.  You need to continue plavix for at least 1 year. Please follow up with cardiologist within 1 week of discharge as you will only be sent home with 1 month supply.   Hold metformin at this time and you can start taking it again in 5/26.     PRINCIPAL DISCHARGE DIAGNOSIS  Diagnosis: Chest pain  Assessment and Plan of Treatment: You presented with chest pain and you were seen by the cardiologist in the hospital and also had cardiac catheterization in which you were placed with 2 stents (PCI MAL) on native right coronary artery. Please contiue with asprin, plavix and atorvastatin as ordered and follow up with outpatient cardiologist.  You need to continue plavix for at least 1 year. Please follow up with cardiologist within 1 week of discharge as you will only be sent home with 1 month supply.   Hold metformin at this time and you can start taking it again in 5/26.     PRINCIPAL DISCHARGE DIAGNOSIS  Diagnosis: Chest pain  Assessment and Plan of Treatment: You presented with chest pain and you were seen by the cardiologist in the hospital and also had cardiac catheterization in which you were placed with 2 stents (PCI MAL) on native right coronary artery. Please contiue with asprin, plavix and atorvastatin as ordered and follow up with outpatient cardiologist.  You need to continue plavix for at least 1 year. Please follow up with cardiologist within 1 week of discharge as you will only be sent home with 1 month supply.   Hold metformin at this time and you can start taking it again in 5/26.      SECONDARY DISCHARGE DIAGNOSES  Diagnosis: Type 2 diabetes mellitus with hyperglycemia, with long-term current use of insulin  Assessment and Plan of Treatment: You have a history of diabetes and you are on a high dose of insulin at home. Please continue to monitor your sugar level at home and follow up with your primary care provider or endocrine doctor to see better control the sugar level. You were only on 10units of insulin in the hospital. Please keep a close eye on it as you go back to your home dosage gradually.

## 2023-05-24 NOTE — DISCHARGE NOTE PROVIDER - PROVIDER TOKENS
Discharge instructions reviewed with pt and pt's family. Pt and pt's family verbalize understanding. PIV discontinued as per md order. Tip intact. Pt tolerated well. Pt discharged to family via wheelchair       
PROVIDER:[TOKEN:[[id number]],FOLLOWUP:[1 week]]

## 2023-05-24 NOTE — PROGRESS NOTE ADULT - PROBLEM/PLAN-2
DISPLAY PLAN FREE TEXT
normal rate, regular rhythm, and no murmur.

## 2023-05-24 NOTE — PROGRESS NOTE ADULT - PROVIDER SPECIALTY LIST ADULT
Cardiology
Cardiology
Internal Medicine
Internal Medicine
Cardiology
Internal Medicine
Internal Medicine
Cardiology

## 2023-05-24 NOTE — DISCHARGE NOTE PROVIDER - NSDCMRMEDTOKEN_GEN_ALL_CORE_FT
Admelog 100 units/mL injectable solution: 30 unit(s) injectable 3 times a day (with meals)  Aspir 81 oral delayed release tablet: 1 tab(s) orally once a day  METFORMIN HCL  MG TABLET: TAKE 1 TABLET (500 MG TOTAL) BY MOUTH IN THE MORNING AND IN THE EVENING  Metoprolol Succinate ER 25 mg oral tablet, extended release: 1 tab(s) orally once a day  OZEMPIC 0.25-0.5 MG/DOSE PEN: INJECT 0.5 MG INTO THE SKIN ONCE A WEEK..  polyethylene glycol 3350 oral powder for reconstitution: 17 gram(s) orally once a day (at bedtime)  Zocor 20 mg oral tablet: 1 tab(s) orally once a day (at bedtime)   Admelog 100 units/mL injectable solution: 30 unit(s) injectable 3 times a day (with meals)  Aspir 81 oral delayed release tablet: 1 tab(s) orally once a day  clopidogrel 75 mg oral tablet: 1 tab(s) orally once a day  METFORMIN HCL  MG TABLET: TAKE 1 TABLET (500 MG TOTAL) BY MOUTH IN THE MORNING AND IN THE EVENING  Metoprolol Succinate ER 25 mg oral tablet, extended release: 1 tab(s) orally once a day  OZEMPIC 0.25-0.5 MG/DOSE PEN: INJECT 0.5 MG INTO THE SKIN ONCE A WEEK..  polyethylene glycol 3350 oral powder for reconstitution: 17 gram(s) orally once a day (at bedtime)  Zocor 20 mg oral tablet: 1 tab(s) orally once a day (at bedtime)

## 2023-05-24 NOTE — PROGRESS NOTE ADULT - ASSESSMENT
62F with PMHx of DM, HTN, CAD s/p CABG in 2012 presents with CP x3 weeks.

## 2023-05-24 NOTE — DISCHARGE NOTE PROVIDER - HOSPITAL COURSE
62F with PMHx of DM, HTN, CAD s/p CABG in 2012 presented with CP x3 weeks.    Hospital Course:   Chest pain.   ·  Plan: ASA, statin, BB  -echo EF 64%, noted   - S/p cardiac cath pm 5/23     CAD (coronary artery disease).   - ASA, statin, BB.    Type 2 diabetes mellitus with hyperglycemia, with long-term current use of insulin.   - On admelog 30u TID and no long acting  - FSG here 110  - admelog 10u TID for now and uptitrate as needed  - ISS, A1C  - hold metformin and ozempic inpatient.    HTN (hypertension).   - BP stable  - resume metoprolol 25mg qd.    HLD (hyperlipidemia).   - Resume statin.    On ______, case discussed with  and Dr. Raymond, pt is medically cleared/stable for discharge to home. 62F with PMHx of DM, HTN, CAD s/p CABG in 2012 presented with CP x3 weeks.    Hospital Course:   Chest pain.   ·  Plan: ASA, statin, BB  -echo EF 64%, noted   - S/p cardiac cath pm 5/23     CAD (coronary artery disease).   - ASA, statin, BB.    Type 2 diabetes mellitus with hyperglycemia, with long-term current use of insulin.   - On admelog 30u TID and no long acting  - FSG here 110  - admelog 10u TID for now and uptitrate as needed  - ISS, A1C  - hold metformin and ozempic inpatient.    HTN (hypertension).   - BP stable  - resume metoprolol 25mg qd.    HLD (hyperlipidemia).   - Resume statin.    On 5/24/23 case discussed with  and Dr. Raymond, pt is medically cleared/stable for discharge to home.

## 2023-05-24 NOTE — PROGRESS NOTE ADULT - PROBLEM SELECTOR PROBLEM 6
Need for prophylactic measure
Admission

## 2023-05-24 NOTE — PROGRESS NOTE ADULT - PROBLEM SELECTOR PLAN 2
Resume ASA, statin, BB
ASA, statin, BB
Resume ASA, statin, BB
ASA, statin, BB
Resume ASA, statin, BB
ASA, statin, BB

## 2023-05-24 NOTE — CHART NOTE - NSCHARTNOTEFT_GEN_A_CORE
Pt is s/p cardiac cath via right femoral access.  Patient denies pain, numbness, tingling, CP, SOB.     T(C): 36.6 (05-23-23 @ 21:20), Max: 36.9 (05-23-23 @ 12:16)  HR: 62 (05-23-23 @ 21:20) (62 - 66)  BP: 146/73 (05-23-23 @ 21:20) (120/67 - 146/73)  RR: 18 (05-23-23 @ 21:20) (17 - 18)  SpO2: 99% (05-23-23 @ 21:20) (97% - 99%)  VSS.     Site is stable with no hematoma, active bleed or swelling.   Dressing is clean/dry/intact.   DP pulse is palpable.   no femoral bruit    Will continue to monitor.       Jesse Llanos PA-C  Department of Internal Medicine  In-House beeper number [id number]

## 2023-05-24 NOTE — DISCHARGE NOTE PROVIDER - CARE PROVIDER_API CALL
Patrice Kovacs)  Cardiology  [address] [place] [address]  Phone: [phone]  Fax: [phone]  Follow Up Time: 1 week

## 2023-05-24 NOTE — PROGRESS NOTE ADULT - PROBLEM SELECTOR PLAN 4
BP stable  -resume metoprolol 25mg qd

## 2023-05-24 NOTE — DISCHARGE NOTE NURSING/CASE MANAGEMENT/SOCIAL WORK - PATIENT PORTAL LINK FT
You can access the FollowMyHealth Patient Portal offered by [facility] by registering at the following website: [email or web address]. By joining [facility]’s FollowMyHealth portal, you will also be able to view your health information using other applications (apps) compatible with our system.

## 2023-05-24 NOTE — PROGRESS NOTE ADULT - PROBLEM SELECTOR PLAN 5
Resume statin

## 2023-05-24 NOTE — PROGRESS NOTE ADULT - PROBLEM/PLAN-4
DISPLAY PLAN FREE TEXT
Attending Psychiatrist supervising NP/Trainee and meeting pt

## 2023-05-24 NOTE — PROGRESS NOTE ADULT - SUBJECTIVE AND OBJECTIVE BOX
PATIENT SEEN AND EXAMINED ON :- 5/23/23  DATE OF SERVICE:   5/23/23          Interim events noted,Labs ,Radiological studies and Cardiology tests reviewed .    MR#[id number]  PATIENT NAME:[facility] COURSE: HPI:  62F with PMHx of DM, HTN, CAD s/p CABG in 2012 presents with CP x3 weeks. Pain is substernal, pressure-like in quality, nonradiating and 8/10. Pain comes and goes and is worse with exertion. She also has ORELLANA as well and chest pressure is worse with deep inspiration. SOB is chronic. She denies LE edema, calf pain, hx DVT/PE, prolonged immobilization. Separately she has some lower back pain not worse with movement in the lumbar area that is not radiating from the chest     (19 May 2023 19:52)      INTERIM EVENTS:Patient seen at bedside ,interim events noted.      PMH -reviewed admission note, no change since admission  HEART FAILURE: Acute[ ]Chronic[ ] Systolic[ ] Diastolic[ ] Combined Systolic and Diastolic[ ]  CAD[ ] CABG[ ] PCI[ ]  DEVICES[ ] PPM[ ] ICD[ ] ILR[ ]  ATRIAL FIBRILLATION[ ] Paroxysmal[ ] Permanent[ ] CHADS2-[  ]  MARQUITA[ ] CKD1[ ] CKD2[ ] CKD3[ ] CKD4[ ] ESRD[ ]  COPD[ ] HTN[ ]   DM[ ] Type1[ ] Type 2[ ]   CVA[ ] Paresis[ ]    AMBULATION: Assisted[ ] Cane/walker[ ] Independent[ ]    MEDICATIONS  (STANDING):  aspirin enteric coated 81 milliGRAM(s) Oral daily  dextrose 5%. 1000 milliLiter(s) (100 mL/Hr) IV Continuous <Continuous>  dextrose 5%. 1000 milliLiter(s) (50 mL/Hr) IV Continuous <Continuous>  dextrose 50% Injectable 25 Gram(s) IV Push once  dextrose 50% Injectable 12.5 Gram(s) IV Push once  dextrose 50% Injectable 25 Gram(s) IV Push once  glucagon  Injectable 1 milliGRAM(s) IntraMuscular once  insulin lispro (ADMELOG) corrective regimen sliding scale   SubCutaneous three times a day before meals  insulin lispro (ADMELOG) corrective regimen sliding scale   SubCutaneous at bedtime  insulin lispro Injectable (ADMELOG) 10 Unit(s) SubCutaneous three times a day before meals  metoprolol succinate ER 25 milliGRAM(s) Oral daily  polyethylene glycol 3350 17 Gram(s) Oral at bedtime  simvastatin 20 milliGRAM(s) Oral at bedtime  sodium chloride 0.9%. 500 milliLiter(s) (100 mL/Hr) IV Continuous <Continuous>    MEDICATIONS  (PRN):  acetaminophen     Tablet .. 650 milliGRAM(s) Oral every 6 hours PRN Temp greater or equal to 38C (100.4F), Mild Pain (1 - 3), Moderate Pain (4 - 6)  dextrose Oral Gel 15 Gram(s) Oral once PRN Blood Glucose LESS THAN 70 milliGRAM(s)/deciliter            REVIEW OF SYSTEMS:  Constitutional: [ ] fever, [ ]weight loss,  [ ]fatigue [ ]weight gain  Eyes: [ ] visual changes  Respiratory: [ ]shortness of breath;  [ ] cough, [ ]wheezing, [ ]chills, [ ]hemoptysis  Cardiovascular: [ ] chest pain, [ ]palpitations, [ ]dizziness,  [ ]leg swelling[ ]orthopnea[ ]PND  Gastrointestinal: [ ] abdominal pain, [ ]nausea, [ ]vomiting,  [ ]diarrhea [ ]Constipation [ ]Melena  Genitourinary: [ ] dysuria, [ ] hematuria [ ]Lemos  Neurologic: [ ] headaches [ ] tremors[ ]weakness [ ]Paralysis Right[ ] Left[ ]  Skin: [ ] itching, [ ]burning, [ ] rashes  Endocrine: [ ] heat or cold intolerance  Musculoskeletal: [ ] joint pain or swelling; [ ] muscle, back, or extremity pain  Psychiatric: [ ] depression, [ ]anxiety, [ ]mood swings, or [ ]difficulty sleeping  Hematologic: [ ] easy bruising, [ ] bleeding gums    [ ] All remaining systems negative except as per above.   [ ]Unable to obtain.  [x] No change in ROS since admission      Vital Signs Last 24 Hrs  T(C): 36.9 (23 May 2023 12:16), Max: 36.9 (23 May 2023 12:16)  T(F): 98.5 (23 May 2023 12:16), Max: 98.5 (23 May 2023 12:16)  HR: 65 (23 May 2023 12:16) (64 - 66)  BP: 122/67 (23 May 2023 12:16) (120/67 - 122/67)  BP(mean): --  RR: 18 (23 May 2023 12:16) (16 - 18)  SpO2: 98% (23 May 2023 12:16) (97% - 98%)    Parameters below as of 23 May 2023 12:16  Patient On (Oxygen Delivery Method): room air      I&O's Summary      PHYSICAL EXAM:  General: No acute distress BMI-  HEENT: EOMI, PERRL  Neck: Supple, [ ] JVD  Lungs: Equal air entry bilaterally; [ ] rales [ ] wheezing [ ] rhonchi  Heart: Regular rate and rhythm; [x ] murmur   2/6 [ x] systolic [ ] diastolic [ ] radiation[ ] rubs [ ]  gallops  Abdomen: Nontender, bowel sounds present  Extremities: No clubbing, cyanosis, [ ] edema [ ]Pulses  equal and intact  Nervous system:  Alert & Oriented X3, no focal deficits  Psychiatric: Normal affect  Skin: No rashes or lesions    LABS:  05-23    133<L>  |  100  |  22  ----------------------------<  249<H>  4.2   |  19<L>  |  0.97    Ca    9.1      23 May 2023 05:45  Phos  3.6     05-23  Mg     1.70     05-23      Creatinine Trend: 0.97<--, 0.93<--, 0.92<--, 0.97<--, 0.99<--                        12.1   6.57  )-----------( 260      ( 23 May 2023 05:45 )             39.2         Patient name: PEGGY DU  YOB: 1960   Age: 62 (F)   MR#: [id number]  Study Date: 5/22/2023  Location: [id number]: Honey Mitchell RDCS  Study quality: Technically good  Referring Physician: Patrice Kovacs MD  Blood Pressure: 117/68 mmHg  Height: 162 cm  Weight: 79 kg  BSA: 1.9 m2  ------------------------------------------------------------------------  PROCEDURE: Transthoracic echocardiogram with 2-D, M-Mode  and complete spectral and color flow Doppler.  INDICATION: Chest pain, unspecified (R07.9)  ------------------------------------------------------------------------  DIMENSIONS:  Dimensions:     Normal Values:  LA:     3.8 cm    2.0 - 4.0 cm  Ao:     3.3 cm    2.0 - 3.8 cm  SEPTUM: 1.0 cm    0.6 - 1.2 cm  PWT:    1.0 cm    0.6 - 1.1 cm  LVIDd:  4.3 cm    3.0 - 5.6 cm  LVIDs:    ---     1.8 - 4.0 cm  Derived Variables:  LVMI: 77 g/m2  RWT: 0.46  Ejection Fraction (Modified Hampton Rule): 64 %  ------------------------------------------------------------------------  OBSERVATIONS:  Mitral Valve: Mitral annular calcification, otherwise  normal mitral valve. Mild mitral regurgitation.  Aortic Root: Normal aortic root.  Aortic Valve: Calcified trileaflet aortic valve with normal  opening. Peak transaortic valve gradient equals 13 mm Hg,  mean transaortic valve gradient equals 7 mm Hg.  Mild-moderate aortic regurgitation.  Left Atrium: Normal left atrium.  LA volume index = 15  cc/m2.  Left Ventricle: Overall preserved left ventricular systolic  function. Basal to mid inferolateral and basal to mid  inferior walls are hypokinetic.  Increased relative wall  thickness with normal left ventricular mass index,  consistent with concentric left ventricular remodeling.  Right Heart: Normal right atrium. The right ventricle is  not well visualized grossly normal size. Normal tricuspid  valve. Minimal tricuspid regurgitation. Normal pulmonic  valve. Minimal pulmonic regurgitation.  Pericardium/PleuraNormal pericardium with no pericardial  effusion.  Hemodynamic: Estimated right ventricular systolic pressure  equals 36 mm Hg, assuming right atrial pressure equals 10  mm Hg, consistent with borderline pulmonary hypertension.  ------------------------------------------------------------------------  CONCLUSIONS:  1. Mitral annular calcification, otherwise normal mitral  valve. Mild mitral regurgitation.  2. Calcified trileaflet aortic valve with normal opening.  Mild-moderate aortic regurgitation.  3. Increased relative wall thickness with normal left  ventricular mass index, consistent with concentric left  ventricular remodeling.  4. Overall preserved left ventricular systolic function.  Basal to mid inferolateral and basal to mid inferior walls  are hypokinetic.  5. The right ventricle is not well visualized grossly  normal size.  6. Estimated pulmonary artery systolic pressure equals 36  mm Hg, assuming right atrial pressure equals 10  mm Hg,  consistent with borderline pulmonary hypertension.  ------------------------------------------------------------------------  Confirmed on  5/22/2023 - 14:27:16 by MIYA Cruz  ------------------------------------------------------------------------        
    SUBJECTIVE / OVERNIGHT EVENTS:pt seen and examined  05-24-23     MEDICATIONS  (STANDING):  aspirin enteric coated 81 milliGRAM(s) Oral daily  clopidogrel Tablet 75 milliGRAM(s) Oral daily  dextrose 5%. 1000 milliLiter(s) (100 mL/Hr) IV Continuous <Continuous>  dextrose 5%. 1000 milliLiter(s) (50 mL/Hr) IV Continuous <Continuous>  dextrose 50% Injectable 25 Gram(s) IV Push once  dextrose 50% Injectable 12.5 Gram(s) IV Push once  dextrose 50% Injectable 25 Gram(s) IV Push once  enoxaparin Injectable 40 milliGRAM(s) SubCutaneous every 24 hours  glucagon  Injectable 1 milliGRAM(s) IntraMuscular once  insulin lispro (ADMELOG) corrective regimen sliding scale   SubCutaneous three times a day before meals  insulin lispro (ADMELOG) corrective regimen sliding scale   SubCutaneous at bedtime  insulin lispro Injectable (ADMELOG) 10 Unit(s) SubCutaneous three times a day before meals  metoprolol succinate ER 25 milliGRAM(s) Oral daily  polyethylene glycol 3350 17 Gram(s) Oral at bedtime  simvastatin 20 milliGRAM(s) Oral at bedtime  sodium chloride 0.9%. 500 milliLiter(s) (100 mL/Hr) IV Continuous <Continuous>    MEDICATIONS  (PRN):  acetaminophen     Tablet .. 650 milliGRAM(s) Oral every 6 hours PRN Temp greater or equal to 38C (100.4F), Mild Pain (1 - 3), Moderate Pain (4 - 6)  dextrose Oral Gel 15 Gram(s) Oral once PRN Blood Glucose LESS THAN 70 milliGRAM(s)/deciliter    Vital Signs Last 24 Hrs  T(C): 36.6 (05-23-23 @ 21:20), Max: 36.9 (05-23-23 @ 12:16)  T(F): 97.8 (05-23-23 @ 21:20), Max: 98.5 (05-23-23 @ 12:16)  HR: 62 (05-23-23 @ 21:20) (62 - 66)  BP: 146/73 (05-23-23 @ 21:20) (120/67 - 146/73)  BP(mean): --  RR: 18 (05-23-23 @ 21:20) (17 - 18)  SpO2: 99% (05-23-23 @ 21:20) (97% - 99%)        Constitutional: No fever, fatigue  Skin: No rash.  Eyes: No recent vision problems or eye pain.  ENT: No congestion, ear pain, or sore throat.  Cardiovascular: No chest pain or palpation.  Respiratory: No cough, shortness of breath, congestion, or wheezing.  Gastrointestinal: No abdominal pain, nausea, vomiting, or diarrhea.  Genitourinary: No dysuria.  Musculoskeletal: No joint swelling.  Neurologic: No headache.    PHYSICAL EXAM:  GENERAL: NAD  EYES: EOMI, PERRLA  NECK: Supple, No JVD  CHEST/LUNG: cta inga  HEART:  S1 , S2 +  ABDOMEN: soft, bs+  EXTREMITIES:  trace edema  NEUROLOGY:alert awake      LABS:  05-23    133<L>  |  100  |  22  ----------------------------<  249<H>  4.2   |  19<L>  |  0.97    Ca    9.1      23 May 2023 05:45  Phos  3.6     05-23  Mg     1.70     05-23      Creatinine Trend: 0.97 <--, 0.93 <--, 0.92 <--, 0.97 <--, 0.99 <--                        12.1   6.57  )-----------( 260      ( 23 May 2023 05:45 )             39.2     Urine Studies:                  RADIOLOGY & ADDITIONAL TESTS:    Imaging Personally Reviewed:    Consultant(s) Notes Reviewed:      Care Discussed with Consultants/Other Providers:  
PATIENT SEEN AND EXAMINED ON :- 5/22/23  DATE OF SERVICE:   5/22/23          Interim events noted,Labs ,Radiological studies and Cardiology tests reviewed .    MR#[id number]  PATIENT NAME:[facility] COURSE: HPI:  62F with PMHx of DM, HTN, CAD s/p CABG in 2012 presents with CP x3 weeks. Pain is substernal, pressure-like in quality, nonradiating and 8/10. Pain comes and goes and is worse with exertion. She also has ORELLANA as well and chest pressure is worse with deep inspiration. SOB is chronic. She denies LE edema, calf pain, hx DVT/PE, prolonged immobilization. Separately she has some lower back pain not worse with movement in the lumbar area that is not radiating from the chest     (19 May 2023 19:52)      INTERIM EVENTS:Patient seen at bedside ,interim events noted.      PMH -reviewed admission note, no change since admission  HEART FAILURE: Acute[ ]Chronic[ ] Systolic[ ] Diastolic[ ] Combined Systolic and Diastolic[ ]  CAD[ ] CABG[ ] PCI[ ]  DEVICES[ ] PPM[ ] ICD[ ] ILR[ ]  ATRIAL FIBRILLATION[ ] Paroxysmal[ ] Permanent[ ] CHADS2-[  ]  MARQUITA[ ] CKD1[ ] CKD2[ ] CKD3[ ] CKD4[ ] ESRD[ ]  COPD[ ] HTN[ ]   DM[ ] Type1[ ] Type 2[ ]   CVA[ ] Paresis[ ]    AMBULATION: Assisted[ ] Cane/walker[ ] Independent[ ]    MEDICATIONS  (STANDING):  aspirin enteric coated 81 milliGRAM(s) Oral daily  dextrose 5%. 1000 milliLiter(s) (50 mL/Hr) IV Continuous <Continuous>  dextrose 5%. 1000 milliLiter(s) (100 mL/Hr) IV Continuous <Continuous>  dextrose 50% Injectable 25 Gram(s) IV Push once  dextrose 50% Injectable 25 Gram(s) IV Push once  dextrose 50% Injectable 12.5 Gram(s) IV Push once  enoxaparin Injectable 40 milliGRAM(s) SubCutaneous every 24 hours  glucagon  Injectable 1 milliGRAM(s) IntraMuscular once  insulin lispro (ADMELOG) corrective regimen sliding scale   SubCutaneous at bedtime  insulin lispro (ADMELOG) corrective regimen sliding scale   SubCutaneous three times a day before meals  insulin lispro Injectable (ADMELOG) 10 Unit(s) SubCutaneous three times a day before meals  metoprolol succinate ER 25 milliGRAM(s) Oral daily  polyethylene glycol 3350 17 Gram(s) Oral at bedtime  simvastatin 20 milliGRAM(s) Oral at bedtime    MEDICATIONS  (PRN):  acetaminophen     Tablet .. 650 milliGRAM(s) Oral every 6 hours PRN Temp greater or equal to 38C (100.4F), Mild Pain (1 - 3), Moderate Pain (4 - 6)  dextrose Oral Gel 15 Gram(s) Oral once PRN Blood Glucose LESS THAN 70 milliGRAM(s)/deciliter            REVIEW OF SYSTEMS:  Constitutional: [ ] fever, [ ]weight loss,  [ ]fatigue [ ]weight gain  Eyes: [ ] visual changes  Respiratory: [ ]shortness of breath;  [ ] cough, [ ]wheezing, [ ]chills, [ ]hemoptysis  Cardiovascular: [ ] chest pain, [ ]palpitations, [ ]dizziness,  [ ]leg swelling[ ]orthopnea[ ]PND  Gastrointestinal: [ ] abdominal pain, [ ]nausea, [ ]vomiting,  [ ]diarrhea [ ]Constipation [ ]Melena  Genitourinary: [ ] dysuria, [ ] hematuria [ ][place]emos  Neurologic: [ ] headaches [ ] tremors[ ]weakness [ ]Paralysis Right[ ] Left[ ]  Skin: [ ] itching, [ ]burning, [ ] rashes  Endocrine: [ ] heat or cold intolerance  Musculoskeletal: [ ] joint pain or swelling; [ ] muscle, back, or extremity pain  Psychiatric: [ ] depression, [ ]anxiety, [ ]mood swings, or [ ]difficulty sleeping  Hematologic: [ ] easy bruising, [ ] bleeding gums    [ ] All remaining systems negative except as per above.   [ ]Unable to obtain.  [x] No change in ROS since admission      Vital Signs Last 24 Hrs  T(C): 37.1 (22 May 2023 17:56), Max: 37.1 (22 May 2023 17:56)  T(F): 98.8 (22 May 2023 17:56), Max: 98.8 (22 May 2023 17:56)  HR: 61 (22 May 2023 17:56) (61 - 77)  BP: 132/65 (22 May 2023 17:56) (117/68 - 140/64)  BP(mean): 77 (22 May 2023 14:13) (77 - 77)  RR: 17 (22 May 2023 17:56) (17 - 18)  SpO2: 98% (22 May 2023 17:56) (96% - 98%)    Parameters below as of 22 May 2023 17:56  Patient On (Oxygen Delivery Method): room air      I&O's Summary      PHYSICAL EXAM:  General: No acute distress BMI-  HEENT: EOMI, PERRL  Neck: Supple, [ ] JVD  Lungs: Equal air entry bilaterally; [ ] rales [ ] wheezing [ ] rhonchi  Heart: Regular rate and rhythm; [x ] murmur   2/6 [ x] systolic [ ] diastolic [ ] radiation[ ] rubs [ ]  gallops  Abdomen: Nontender, bowel sounds present  Extremities: No clubbing, cyanosis, [ ] edema [ ]Pulses  equal and intact  Nervous system:  Alert & Oriented X3, no focal deficits  Psychiatric: Normal affect  Skin: No rashes or lesions    LABS:  05-22    132<L>  |  99  |  18  ----------------------------<  277<H>  4.2   |  21<L>  |  0.93    Ca    9.4      22 May 2023 07:25  Phos  3.6     05-22  Mg     1.70     05-22      Creatinine Trend: 0.93<--, 0.92<--, 0.97<--, 0.99<--                        12.6   6.20  )-----------( 290      ( 22 May 2023 07:25 )             41.5       < from: Transthoracic Echocardiogram (05.22.23 @ 11:51) >    Patient name: PEGGY DU  YOB: 1960   Age: 62 (F)   MR#: [id number]  Study Date: 5/22/2023  Location: [id number]: Honey Mitchell RDCS  Study quality: Technically good  Referring Physician: Patrice Kovacs MD  Blood Pressure: 117/68 mmHg  Height: 162 cm  Weight: 79 kg  BSA: 1.9 m2  ------------------------------------------------------------------------  PROCEDURE: Transthoracic echocardiogram with 2-D, M-Mode  and complete spectral and color flow Doppler.  INDICATION: Chest pain, unspecified (R07.9)  ------------------------------------------------------------------------  DIMENSIONS:  Dimensions:     Normal Values:  LA:     3.8 cm    2.0 - 4.0 cm  Ao:     3.3 cm    2.0 - 3.8 cm  SEPTUM: 1.0 cm    0.6 - 1.2 cm  PWT:    1.0 cm    0.6 - 1.1 cm  LVIDd:  4.3 cm    3.0 - 5.6 cm  LVIDs:    ---     1.8 - 4.0 cm  Derived Variables:  LVMI: 77 g/m2  RWT: 0.46  Ejection Fraction (Modified Hampton Rule): 64 %  ------------------------------------------------------------------------  OBSERVATIONS:  Mitral Valve: Mitral annular calcification, otherwise  normal mitral valve. Mild mitral regurgitation.  Aortic Root: Normal aortic root.  Aortic Valve: Calcified trileaflet aortic valve with normal  opening. Peak transaortic valve gradient equals 13 mm Hg,  mean transaortic valve gradient equals 7 mm Hg.  Mild-moderate aortic regurgitation.  Left Atrium: Normal left atrium.  LA volume index = 15  cc/m2.  Left Ventricle: Overall preserved left ventricular systolic  function. Basal to mid inferolateral and basal to mid  inferior walls are hypokinetic.  Increased relative wall  thickness with normal left ventricular mass index,  consistent with concentric left ventricular remodeling.  Right Heart: Normal right atrium. The right ventricle is  not well visualized grossly normal size. Normal tricuspid  valve. Minimal tricuspid regurgitation. Normal pulmonic  valve. Minimal pulmonic regurgitation.  Pericardium/PleuraNormal pericardium with no pericardial  effusion.  Hemodynamic: Estimated right ventricular systolic pressure  equals 36 mm Hg, assuming right atrial pressure equals 10  mm Hg, consistent with borderline pulmonary hypertension.  ------------------------------------------------------------------------  CONCLUSIONS:  1. Mitral annular calcification, otherwise normal mitral  valve. Mild mitral regurgitation.  2. Calcified trileaflet aortic valve with normal opening.  Mild-moderate aortic regurgitation.  3. Increased relative wall thickness with normal left  ventricular mass index, consistent with concentric left  ventricular remodeling.  4. Overall preserved left ventricular systolic function.  Basal to mid inferolateral and basal to mid inferior walls  are hypokinetic.  5. The right ventricle is not well visualized grossly  normal size.  6. Estimated pulmonary artery systolic pressure equals 36  mm Hg, assuming right atrial pressure equals 10  mm Hg,  consistent with borderline pulmonary hypertension.  ------------------------------------------------------------------------  Confirmed on  5/22/2023 - 14:27:16 by MIYA Cruz  ------------------------------------------------------------------------    < end of copied text >          
    SUBJECTIVE / OVERNIGHT EVENTS:  05-21-23 @ 00:31    MEDICATIONS  (STANDING):  aspirin enteric coated 81 milliGRAM(s) Oral daily  dextrose 5%. 1000 milliLiter(s) (50 mL/Hr) IV Continuous <Continuous>  dextrose 5%. 1000 milliLiter(s) (100 mL/Hr) IV Continuous <Continuous>  dextrose 50% Injectable 25 Gram(s) IV Push once  dextrose 50% Injectable 25 Gram(s) IV Push once  dextrose 50% Injectable 12.5 Gram(s) IV Push once  enoxaparin Injectable 40 milliGRAM(s) SubCutaneous every 24 hours  glucagon  Injectable 1 milliGRAM(s) IntraMuscular once  insulin lispro (ADMELOG) corrective regimen sliding scale   SubCutaneous at bedtime  insulin lispro (ADMELOG) corrective regimen sliding scale   SubCutaneous three times a day before meals  insulin lispro Injectable (ADMELOG) 10 Unit(s) SubCutaneous three times a day before meals  metoprolol succinate ER 25 milliGRAM(s) Oral daily  polyethylene glycol 3350 17 Gram(s) Oral at bedtime  simvastatin 20 milliGRAM(s) Oral at bedtime    MEDICATIONS  (PRN):  acetaminophen     Tablet .. 650 milliGRAM(s) Oral every 6 hours PRN Temp greater or equal to 38C (100.4F), Mild Pain (1 - 3), Moderate Pain (4 - 6)  dextrose Oral Gel 15 Gram(s) Oral once PRN Blood Glucose LESS THAN 70 milliGRAM(s)/deciliter    T(C): 36.7 (05-20-23 @ 22:31), Max: 36.7 (05-20-23 @ 22:31)  HR: 59 (05-20-23 @ 22:31) (56 - 61)  BP: 125/63 (05-20-23 @ 22:31) (125/63 - 158/72)  RR: 17 (05-20-23 @ 22:31) (17 - 18)  SpO2: 98% (05-20-23 @ 22:31) (97% - 100%)    CAPILLARY BLOOD GLUCOSE      POCT Blood Glucose.: 139 mg/dL (20 May 2023 22:01)  POCT Blood Glucose.: 161 mg/dL (20 May 2023 17:35)  POCT Blood Glucose.: 194 mg/dL (20 May 2023 12:10)  POCT Blood Glucose.: 227 mg/dL (20 May 2023 08:21)    I&O's Summary      Constitutional: No fever, fatigue  Skin: No rash.  Eyes: No recent vision problems or eye pain.  ENT: No congestion, ear pain, or sore throat.  Cardiovascular: No chest pain or palpation.  Respiratory: No cough, shortness of breath, congestion, or wheezing.  Gastrointestinal: No abdominal pain, nausea, vomiting, or diarrhea.  Genitourinary: No dysuria.  Musculoskeletal: No joint swelling.  Neurologic: No headache.    PHYSICAL EXAM:  GENERAL: NAD  EYES: EOMI, PERRLA  NECK: Supple, No JVD  CHEST/LUNG: cta inga  HEART:  S1 , S2 +  ABDOMEN: soft, bs+  EXTREMITIES:  trace edema  NEUROLOGY:alert awake      LABS:                        12.2   5.87  )-----------( 288      ( 20 May 2023 05:50 )             40.7     05-20    135  |  99  |  15  ----------------------------<  231<H>  4.4   |  22  |  0.97    Ca    9.4      20 May 2023 05:50  Phos  3.7     05-20  Mg     1.80     05-20    TPro  7.6  /  Alb  4.4  /  TBili  0.4  /  DBili  x   /  AST  29  /  ALT  18  /  AlkPhos  92  05-19    PT/INR - ( 20 May 2023 05:50 )   PT: 11.2 sec;   INR: 0.97 ratio         PTT - ( 20 May 2023 05:50 )  PTT:32.0 sec  CARDIAC MARKERS ( 20 May 2023 05:50 )  x     / x     / 131 U/L / x     / 6.7 ng/mL  CARDIAC MARKERS ( 19 May 2023 23:38 )  x     / x     / 157 U/L / x     / 7.7 ng/mL  CARDIAC MARKERS ( 19 May 2023 21:39 )  x     / x     / 168 U/L / x     / 8.0 ng/mL          RADIOLOGY & ADDITIONAL TESTS:    Imaging Personally Reviewed:    Consultant(s) Notes Reviewed:      Care Discussed with Consultants/Other Providers:  
    SUBJECTIVE / OVERNIGHT EVENTS:pt seen and examined  05-23-23     MEDICATIONS  (STANDING):  aspirin enteric coated 81 milliGRAM(s) Oral daily  clopidogrel Tablet 75 milliGRAM(s) Oral daily  dextrose 5%. 1000 milliLiter(s) (100 mL/Hr) IV Continuous <Continuous>  dextrose 5%. 1000 milliLiter(s) (50 mL/Hr) IV Continuous <Continuous>  dextrose 50% Injectable 25 Gram(s) IV Push once  dextrose 50% Injectable 12.5 Gram(s) IV Push once  dextrose 50% Injectable 25 Gram(s) IV Push once  enoxaparin Injectable 40 milliGRAM(s) SubCutaneous every 24 hours  glucagon  Injectable 1 milliGRAM(s) IntraMuscular once  insulin lispro (ADMELOG) corrective regimen sliding scale   SubCutaneous three times a day before meals  insulin lispro (ADMELOG) corrective regimen sliding scale   SubCutaneous at bedtime  insulin lispro Injectable (ADMELOG) 10 Unit(s) SubCutaneous three times a day before meals  metoprolol succinate ER 25 milliGRAM(s) Oral daily  polyethylene glycol 3350 17 Gram(s) Oral at bedtime  simvastatin 20 milliGRAM(s) Oral at bedtime  sodium chloride 0.9%. 500 milliLiter(s) (100 mL/Hr) IV Continuous <Continuous>    MEDICATIONS  (PRN):  acetaminophen     Tablet .. 650 milliGRAM(s) Oral every 6 hours PRN Temp greater or equal to 38C (100.4F), Mild Pain (1 - 3), Moderate Pain (4 - 6)  dextrose Oral Gel 15 Gram(s) Oral once PRN Blood Glucose LESS THAN 70 milliGRAM(s)/deciliter    Vital Signs Last 24 Hrs  T(C): 36.6 (05-23-23 @ 21:20), Max: 36.9 (05-23-23 @ 12:16)  T(F): 97.8 (05-23-23 @ 21:20), Max: 98.5 (05-23-23 @ 12:16)  HR: 62 (05-23-23 @ 21:20) (62 - 66)  BP: 146/73 (05-23-23 @ 21:20) (120/67 - 146/73)  BP(mean): --  RR: 18 (05-23-23 @ 21:20) (17 - 18)  SpO2: 99% (05-23-23 @ 21:20) (97% - 99%)        Constitutional: No fever, fatigue  Skin: No rash.  Eyes: No recent vision problems or eye pain.  ENT: No congestion, ear pain, or sore throat.  Cardiovascular: No chest pain or palpation.  Respiratory: No cough, shortness of breath, congestion, or wheezing.  Gastrointestinal: No abdominal pain, nausea, vomiting, or diarrhea.  Genitourinary: No dysuria.  Musculoskeletal: No joint swelling.  Neurologic: No headache.    PHYSICAL EXAM:  GENERAL: NAD  EYES: EOMI, PERRLA  NECK: Supple, No JVD  CHEST/LUNG: cta inga  HEART:  S1 , S2 +  ABDOMEN: soft, bs+  EXTREMITIES:  trace edema  NEUROLOGY:alert awake      LABS:  05-23    133<L>  |  100  |  22  ----------------------------<  249<H>  4.2   |  19<L>  |  0.97    Ca    9.1      23 May 2023 05:45  Phos  3.6     05-23  Mg     1.70     05-23      Creatinine Trend: 0.97 <--, 0.93 <--, 0.92 <--, 0.97 <--, 0.99 <--                        12.1   6.57  )-----------( 260      ( 23 May 2023 05:45 )             39.2     Urine Studies:                  RADIOLOGY & ADDITIONAL TESTS:    Imaging Personally Reviewed:    Consultant(s) Notes Reviewed:      Care Discussed with Consultants/Other Providers:  
PATIENT SEEN AND EXAMINED ON :- 5/20/23  DATE OF SERVICE:   5/20/23          Interim events noted,Labs ,Radiological studies and Cardiology tests reviewed .    MR#[id number]  PATIENT NAME:[facility] COURSE: HPI:  62F with PMHx of DM, HTN, CAD s/p CABG in 2012 presents with CP x3 weeks. Pain is substernal, pressure-like in quality, nonradiating and 8/10. Pain comes and goes and is worse with exertion. She also has ORELLANA as well and chest pressure is worse with deep inspiration. SOB is chronic. She denies LE edema, calf pain, hx DVT/PE, prolonged immobilization. Separately she has some lower back pain not worse with movement in the lumbar area that is not radiating from the chest     (19 May 2023 19:52)      INTERIM EVENTS:Patient seen at bedside ,interim events noted.      PMH -reviewed admission note, no change since admission  HEART FAILURE: Acute[ ]Chronic[ ] Systolic[ ] Diastolic[ ] Combined Systolic and Diastolic[ ]  CAD[ ] CABG[ ] PCI[ ]  DEVICES[ ] PPM[ ] ICD[ ] ILR[ ]  ATRIAL FIBRILLATION[ ] Paroxysmal[ ] Permanent[ ] CHADS2-[  ]  MARQUITA[ ] CKD1[ ] CKD2[ ] CKD3[ ] CKD4[ ] ESRD[ ]  COPD[ ] HTN[ ]   DM[ ] Type1[ ] Type 2[ ]   CVA[ ] Paresis[ ]    AMBULATION: Assisted[ ] Cane/walker[ ] Independent[ ]    MEDICATIONS  (STANDING):  aspirin enteric coated 81 milliGRAM(s) Oral daily  dextrose 5%. 1000 milliLiter(s) (50 mL/Hr) IV Continuous <Continuous>  dextrose 5%. 1000 milliLiter(s) (100 mL/Hr) IV Continuous <Continuous>  dextrose 50% Injectable 25 Gram(s) IV Push once  dextrose 50% Injectable 25 Gram(s) IV Push once  dextrose 50% Injectable 12.5 Gram(s) IV Push once  enoxaparin Injectable 40 milliGRAM(s) SubCutaneous every 24 hours  glucagon  Injectable 1 milliGRAM(s) IntraMuscular once  insulin lispro (ADMELOG) corrective regimen sliding scale   SubCutaneous at bedtime  insulin lispro (ADMELOG) corrective regimen sliding scale   SubCutaneous three times a day before meals  insulin lispro Injectable (ADMELOG) 10 Unit(s) SubCutaneous three times a day before meals  metoprolol succinate ER 25 milliGRAM(s) Oral daily  polyethylene glycol 3350 17 Gram(s) Oral at bedtime  simvastatin 20 milliGRAM(s) Oral at bedtime    MEDICATIONS  (PRN):  acetaminophen     Tablet .. 650 milliGRAM(s) Oral every 6 hours PRN Temp greater or equal to 38C (100.4F), Mild Pain (1 - 3), Moderate Pain (4 - 6)  dextrose Oral Gel 15 Gram(s) Oral once PRN Blood Glucose LESS THAN 70 milliGRAM(s)/deciliter            REVIEW OF SYSTEMS:  Constitutional: [ ] fever, [ ]weight loss,  [ ]fatigue [ ]weight gain  Eyes: [ ] visual changes  Respiratory: [ ]shortness of breath;  [ ] cough, [ ]wheezing, [ ]chills, [ ]hemoptysis  Cardiovascular: [ ] chest pain, [ ]palpitations, [ ]dizziness,  [ ]leg swelling[ ]orthopnea[ ]PND  Gastrointestinal: [ ] abdominal pain, [ ]nausea, [ ]vomiting,  [ ]diarrhea [ ]Constipation [ ]Melena  Genitourinary: [ ] dysuria, [ ] hematuria [ ]Lemos  Neurologic: [ ] headaches [ ] tremors[ ]weakness [ ]Paralysis Right[ ] Left[ ]  Skin: [ ] itching, [ ]burning, [ ] rashes  Endocrine: [ ] heat or cold intolerance  Musculoskeletal: [ ] joint pain or swelling; [ ] muscle, back, or extremity pain  Psychiatric: [ ] depression, [ ]anxiety, [ ]mood swings, or [ ]difficulty sleeping  Hematologic: [ ] easy bruising, [ ] bleeding gums    [ ] All remaining systems negative except as per above.   [ ]Unable to obtain.  [x] No change in ROS since admission      Vital Signs Last 24 Hrs  T(C): 36.4 (20 May 2023 16:56), Max: 36.6 (19 May 2023 22:09)  T(F): 97.5 (20 May 2023 16:56), Max: 97.9 (19 May 2023 22:09)  HR: 56 (20 May 2023 16:56) (56 - 61)  BP: 158/72 (20 May 2023 16:56) (138/55 - 158/72)  BP(mean): --  RR: 18 (20 May 2023 16:56) (17 - 18)  SpO2: 99% (20 May 2023 16:56) (97% - 100%)    Parameters below as of 20 May 2023 16:56  Patient On (Oxygen Delivery Method): room air      I&O's Summary      PHYSICAL EXAM:  General: No acute distress BMI-  HEENT: EOMI, PERRL  Neck: Supple, [ ] JVD  Lungs: Equal air entry bilaterally; [ ] rales [ ] wheezing [ ] rhonchi  Heart: Regular rate and rhythm; [x ] murmur   2/6 [ x] systolic [ ] diastolic [ ] radiation[ ] rubs [ ]  gallops  Abdomen: Nontender, bowel sounds present  Extremities: No clubbing, cyanosis, [ ] edema [ ]Pulses  equal and intact  Nervous system:  Alert & Oriented X3, no focal deficits  Psychiatric: Normal affect  Skin: No rashes or lesions    LABS:  05-20    135  |  99  |  15  ----------------------------<  231<H>  4.4   |  22  |  0.97    Ca    9.4      20 May 2023 05:50  Phos  3.7     05-20  Mg     1.80     05-20    TPro  7.6  /  Alb  4.4  /  TBili  0.4  /  DBili  x   /  AST  29  /  ALT  18  /  AlkPhos  92  05-19    Creatinine Trend: 0.97<--, 0.99<--                        12.2   5.87  )-----------( 288      ( 20 May 2023 05:50 )             40.7     PT/INR - ( 20 May 2023 05:50 )   PT: 11.2 sec;   INR: 0.97 ratio         PTT - ( 20 May 2023 05:50 )  PTT:32.0 sec      < from: Transthoracic Echocardiogram w/Doppler (08.16.12 @ 15:26) >  CONCLUSIONS:  1. Mild mitral regurgitation.  2. Normal trileaflet aortic valve. Mild-moderate aortic  regurgitation.  3. Mild segmental left ventricular systolic dysfunction.  The basal to mid inferolateral and basal inferior walls are  hypokinetic. The over all  EF is preserved.  4. Normal right ventricular size and function.  ------------------------------------------------------------------------  Confirmed on  8/16/2012 - 19:21:53 by Charlotte Ya MD  ------------------------------------------------------------------------    < end of copied text >      
    SUBJECTIVE / OVERNIGHT EVENTS:pt seen and examined  05-21-23     MEDICATIONS  (STANDING):  aspirin enteric coated 81 milliGRAM(s) Oral daily  dextrose 5%. 1000 milliLiter(s) (50 mL/Hr) IV Continuous <Continuous>  dextrose 5%. 1000 milliLiter(s) (100 mL/Hr) IV Continuous <Continuous>  dextrose 50% Injectable 25 Gram(s) IV Push once  dextrose 50% Injectable 25 Gram(s) IV Push once  dextrose 50% Injectable 12.5 Gram(s) IV Push once  enoxaparin Injectable 40 milliGRAM(s) SubCutaneous every 24 hours  glucagon  Injectable 1 milliGRAM(s) IntraMuscular once  insulin lispro (ADMELOG) corrective regimen sliding scale   SubCutaneous at bedtime  insulin lispro (ADMELOG) corrective regimen sliding scale   SubCutaneous three times a day before meals  insulin lispro Injectable (ADMELOG) 10 Unit(s) SubCutaneous three times a day before meals  metoprolol succinate ER 25 milliGRAM(s) Oral daily  polyethylene glycol 3350 17 Gram(s) Oral at bedtime  simvastatin 20 milliGRAM(s) Oral at bedtime    MEDICATIONS  (PRN):  acetaminophen     Tablet .. 650 milliGRAM(s) Oral every 6 hours PRN Temp greater or equal to 38C (100.4F), Mild Pain (1 - 3), Moderate Pain (4 - 6)  dextrose Oral Gel 15 Gram(s) Oral once PRN Blood Glucose LESS THAN 70 milliGRAM(s)/deciliter    Vital Signs Last 24 Hrs  T(C): 36.5 (05-21-23 @ 21:47), Max: 36.7 (05-21-23 @ 13:30)  T(F): 97.7 (05-21-23 @ 21:47), Max: 98 (05-21-23 @ 13:30)  HR: 75 (05-21-23 @ 21:47) (61 - 75)  BP: 140/64 (05-21-23 @ 21:47) (128/70 - 150/72)  BP(mean): --  RR: 18 (05-21-23 @ 21:47) (18 - 19)  SpO2: 96% (05-21-23 @ 21:47) (96% - 100%)      Constitutional: No fever, fatigue  Skin: No rash.  Eyes: No recent vision problems or eye pain.  ENT: No congestion, ear pain, or sore throat.  Cardiovascular: No chest pain or palpation.  Respiratory: No cough, shortness of breath, congestion, or wheezing.  Gastrointestinal: No abdominal pain, nausea, vomiting, or diarrhea.  Genitourinary: No dysuria.  Musculoskeletal: No joint swelling.  Neurologic: No headache.    PHYSICAL EXAM:  GENERAL: NAD  EYES: EOMI, PERRLA  NECK: Supple, No JVD  CHEST/LUNG: cta inga  HEART:  S1 , S2 +  ABDOMEN: soft, bs+  EXTREMITIES:  trace edema  NEUROLOGY:alert awake      LABS:  05-21    136  |  99  |  16  ----------------------------<  216<H>  4.0   |  20<L>  |  0.92    Ca    9.4      21 May 2023 05:38  Phos  3.7     05-21  Mg     1.80     05-21      Creatinine Trend: 0.92 <--, 0.97 <--, 0.99 <--                        12.3   6.43  )-----------( 264      ( 21 May 2023 05:38 )             40.8     Urine Studies:      CARDIAC MARKERS ( 20 May 2023 05:50 )  x     / x     / 131 U/L / x     / 6.7 ng/mL  CARDIAC MARKERS ( 19 May 2023 23:38 )  x     / x     / 157 U/L / x     / 7.7 ng/mL          PT/INR - ( 20 May 2023 05:50 )   PT: 11.2 sec;   INR: 0.97 ratio         PTT - ( 20 May 2023 05:50 )  PTT:32.0 sec      RADIOLOGY & ADDITIONAL TESTS:    Imaging Personally Reviewed:    Consultant(s) Notes Reviewed:      Care Discussed with Consultants/Other Providers:  
PATIENT SEEN AND EXAMINED ON :- 5/21/23  DATE OF SERVICE:   5/21/23          Interim events noted,Labs ,Radiological studies and Cardiology tests reviewed .    MR#[id number]  PATIENT NAME:[facility] COURSE: HPI:  62F with PMHx of DM, HTN, CAD s/p CABG in 2012 presents with CP x3 weeks. Pain is substernal, pressure-like in quality, nonradiating and 8/10. Pain comes and goes and is worse with exertion. She also has ORELLANA as well and chest pressure is worse with deep inspiration. SOB is chronic. She denies LE edema, calf pain, hx DVT/PE, prolonged immobilization. Separately she has some lower back pain not worse with movement in the lumbar area that is not radiating from the chest     (19 May 2023 19:52)      INTERIM EVENTS:Patient seen at bedside ,interim events noted.      PMH -reviewed admission note, no change since admission  HEART FAILURE: Acute[ ]Chronic[ ] Systolic[ ] Diastolic[ ] Combined Systolic and Diastolic[ ]  CAD[ ] CABG[ ] PCI[ ]  DEVICES[ ] PPM[ ] ICD[ ] ILR[ ]  ATRIAL FIBRILLATION[ ] Paroxysmal[ ] Permanent[ ] CHADS2-[  ]  MARQUITA[ ] CKD1[ ] CKD2[ ] CKD3[ ] CKD4[ ] ESRD[ ]  COPD[ ] HTN[ ]   DM[ ] Type1[ ] Type 2[ ]   CVA[ ] Paresis[ ]    AMBULATION: Assisted[ ] Cane/walker[ ] Independent[ ]    MEDICATIONS  (STANDING):  aspirin enteric coated 81 milliGRAM(s) Oral daily  dextrose 5%. 1000 milliLiter(s) (50 mL/Hr) IV Continuous <Continuous>  dextrose 5%. 1000 milliLiter(s) (100 mL/Hr) IV Continuous <Continuous>  dextrose 50% Injectable 25 Gram(s) IV Push once  dextrose 50% Injectable 25 Gram(s) IV Push once  dextrose 50% Injectable 12.5 Gram(s) IV Push once  enoxaparin Injectable 40 milliGRAM(s) SubCutaneous every 24 hours  glucagon  Injectable 1 milliGRAM(s) IntraMuscular once  insulin lispro (ADMELOG) corrective regimen sliding scale   SubCutaneous at bedtime  insulin lispro (ADMELOG) corrective regimen sliding scale   SubCutaneous three times a day before meals  insulin lispro Injectable (ADMELOG) 10 Unit(s) SubCutaneous three times a day before meals  metoprolol succinate ER 25 milliGRAM(s) Oral daily  polyethylene glycol 3350 17 Gram(s) Oral at bedtime  simvastatin 20 milliGRAM(s) Oral at bedtime    MEDICATIONS  (PRN):  acetaminophen     Tablet .. 650 milliGRAM(s) Oral every 6 hours PRN Temp greater or equal to 38C (100.4F), Mild Pain (1 - 3), Moderate Pain (4 - 6)  dextrose Oral Gel 15 Gram(s) Oral once PRN Blood Glucose LESS THAN 70 milliGRAM(s)/deciliter            REVIEW OF SYSTEMS:  Constitutional: [ ] fever, [ ]weight loss,  [ ]fatigue [ ]weight gain  Eyes: [ ] visual changes  Respiratory: [ ]shortness of breath;  [ ] cough, [ ]wheezing, [ ]chills, [ ]hemoptysis  Cardiovascular: [ ] chest pain, [ ]palpitations, [ ]dizziness,  [ ]leg swelling[ ]orthopnea[ ]PND  Gastrointestinal: [ ] abdominal pain, [ ]nausea, [ ]vomiting,  [ ]diarrhea [ ]Constipation [ ]Melena  Genitourinary: [ ] dysuria, [ ] hematuria [ ][place]emos  Neurologic: [ ] headaches [ ] tremors[ ]weakness [ ]Paralysis Right[ ] Left[ ]  Skin: [ ] itching, [ ]burning, [ ] rashes  Endocrine: [ ] heat or cold intolerance  Musculoskeletal: [ ] joint pain or swelling; [ ] muscle, back, or extremity pain  Psychiatric: [ ] depression, [ ]anxiety, [ ]mood swings, or [ ]difficulty sleeping  Hematologic: [ ] easy bruising, [ ] bleeding gums    [ ] All remaining systems negative except as per above.   [ ]Unable to obtain.  [x] No change in ROS since admission      Vital Signs Last 24 Hrs  T(C): 36.7 (21 May 2023 13:30), Max: 36.7 (20 May 2023 22:31)  T(F): 98 (21 May 2023 13:30), Max: 98 (20 May 2023 22:31)  HR: 61 (21 May 2023 13:30) (59 - 74)  BP: 150/72 (21 May 2023 13:30) (125/63 - 150/72)  BP(mean): --  RR: 19 (21 May 2023 13:30) (17 - 19)  SpO2: 100% (21 May 2023 13:30) (98% - 100%)    Parameters below as of 21 May 2023 13:30  Patient On (Oxygen Delivery Method): room air      I&O's Summary      PHYSICAL EXAM:  General: No acute distress BMI-  HEENT: EOMI, PERRL  Neck: Supple, [ ] JVD  Lungs: Equal air entry bilaterally; [ ] rales [ ] wheezing [ ] rhonchi  Heart: Regular rate and rhythm; [x ] murmur   2/6 [ x] systolic [ ] diastolic [ ] radiation[ ] rubs [ ]  gallops  Abdomen: Nontender, bowel sounds present  Extremities: No clubbing, cyanosis, [ ] edema [ ]Pulses  equal and intact  Nervous system:  Alert & Oriented X3, no focal deficits  Psychiatric: Normal affect  Skin: No rashes or lesions    LABS:  05-21    136  |  99  |  16  ----------------------------<  216<H>  4.0   |  20<L>  |  0.92    Ca    9.4      21 May 2023 05:38  Phos  3.7     05-21  Mg     1.80     05-21      Creatinine Trend: 0.92<--, 0.97<--, 0.99<--                        12.3   6.43  )-----------( 264      ( 21 May 2023 05:38 )             40.8     PT/INR - ( 20 May 2023 05:50 )   PT: 11.2 sec;   INR: 0.97 ratio         PTT - ( 20 May 2023 05:50 )  PTT:32.0 sec      < from: Transthoracic Echocardiogram w/Doppler (08.16.12 @ 15:26) >    Patient name: PEGGY DU  YOB: 1960   Age: 51 (F)   MR#: [id number]  Study Date: 8/16/2012  [facility]  Study quality: Technically Fair  Referring Physician: JOSE ESCALERA  ------------------------------------------------------------------------  Blood Pressure: 117/79 mmHg  Height: 5ft 4in  Weight: 145 lb  BSA: 1.7 m2  ------------------------------------------------------------------------  PROCEDURE: Transthoracic echocardiogram with 2-D, M-Mode  and complete spectral and color flow Doppler.  INDICATION: Coronary Artery Disease (414.01)  ------------------------------------------------------------------------  DIMENSIONS:  Dimensions:     Normal Values:  LA:     3.4 cm 2.0 - 4.0 cm  Ao:     3.2 cm    2.0 - 3.8 cm  SEPTUM: 1.0 cm    0.6 - 1.2 cm  PWT:    0.7 cm    0.6 - 1.1 cm  LVIDd:  4.5 cm    3.0 - 5.6 cm  LVIDs:  3.1 cm    1.8 - 4.0 cm  Derived Variables:  LVMI: 72 g/m2  RWT: 0.31  Fractional short: 31 %  Ejection Fraction: 55 %  ------------------------------------------------------------------------  OBSERVATIONS:  Mitral Valve: Normal mitral valve.  Mild mitral regurgitation.  Aortic Root: Normal aortic root.  Aortic Valve: Normal trileaflet aortic valve.  Mild-moderate aortic regurgitation.  Left Atrium: Normal left atrium  Left Ventricle: Normal left ventricular internal dimensions  and wall thicknesses.  Mild segmental left ventricular systolic dysfunction. The  basal to mid inferolateral and basal inferior walls are  hypokinetic. The over all  EF is preserved.  Right Heart: Normal right atrium.  Normal right ventricular size and function.  Normal tricuspid valve. Minimal tricuspid regurgitation.  Normal pulmonic valve.  Pericardium/PleuraNormal pericardium with no pericardial  effusion.  ------------------------------------------------------------------------  CONCLUSIONS:  1. Mild mitral regurgitation.  2. Normal trileaflet aortic valve. Mild-moderate aortic  regurgitation.  3. Mild segmental left ventricular systolic dysfunction.  The basal to mid inferolateral and basal inferior walls are  hypokinetic. The over all  EF is preserved.  4. Normal right ventricular size and function.  ------------------------------------------------------------------------  Confirmed on  8/16/2012 - 19:21:53 by Charlotte Ya MD  ------------------------------------------------------------------------    < end of copied text >